# Patient Record
Sex: FEMALE | Race: WHITE | NOT HISPANIC OR LATINO | Employment: OTHER | URBAN - METROPOLITAN AREA
[De-identification: names, ages, dates, MRNs, and addresses within clinical notes are randomized per-mention and may not be internally consistent; named-entity substitution may affect disease eponyms.]

---

## 2017-03-17 NOTE — PATIENT DISCUSSION
(B30.8) Other viral conjunctivitis - Assesment : Examination revealed Viral Conjunctivitis, resolving. Patient currently being treated by Dr. Ivan Perales with Tobramycin and Amoxicillin.  Advice patient to use caution, may be contagious - Plan : Continue Tobramycin QID OU for 2 more days then D/C, continue Amoxicillin until gone RV as scheduled

## 2017-06-20 NOTE — PATIENT DISCUSSION
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 Mac OCT today. Follows with Dr. Florentin Rico, Pt reports cleared for cataract surgery at last visit 2 weeks ago. - Plan : Continue following with Dr. Florentin Rico as scheduled.

## 2017-06-20 NOTE — PATIENT DISCUSSION
(H25.013) Cortical age-related cataract, bilateral - Assesment : Examination revealed Cortical Senile Cataract.  - Plan : See plan #1

## 2017-06-20 NOTE — PATIENT DISCUSSION
(H25.13) Age-related nuclear cataract, bilateral - Assesment : Examination revealed cataract. Pt is bothered and symptomatic. Pt reports was cleared for CE by  at last visit 2 weeks ago. Will request records. - Plan : Recommended cataract extraction to improve visual function. Discussed procedure, risks, and benefits. Answered questions. Pt wishes to proceed. Schedule ASCAN and consult.

## 2017-06-20 NOTE — PATIENT DISCUSSION
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 HVF being monitored by Dr. Ester Hill. - Plan : Continue following with Dr. Ester Hill.

## 2017-06-20 NOTE — PATIENT DISCUSSION
(H25.934) Posterior subcapsular polar age-related cataract, bilateral - Assesment : Examination revealed Posterior Subcapsular Polar Senile Cataract. - Plan : See plan #1.

## 2017-06-20 NOTE — PATIENT DISCUSSION
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 Mac OCT today. Pt follows with Dr. Armando Cordon. - Plan : Monitor for changes yearly with Exams and Mac OCTs. Advised patient to call our office with decreased vision or increased distortion. Continue monitoring Amsler Grid. Continue following with Dr. Armando Cordon as scheduled.

## 2017-08-07 NOTE — PATIENT DISCUSSION
(W92.4183) Nonexudative age-related macular degeneration right eye inte - Assesment : Examination revealed AMD Dry. Mac OCT today. Pt follows with Dr. Araujo President. - Plan : Monitor for changes yearly with Exams and Mac OCTs. Advised patient to call our office with decreased vision or increased distortion. Continue monitoring Amsler Grid. Continue following with Dr. Araujo President as scheduled.

## 2017-08-07 NOTE — PATIENT DISCUSSION
(H25.13) Age-related nuclear cataract, bilateral - Assesment : Examination revealed cataract. Pt is bothered and symptomatic. Pt reports was cleared for CE by  at last visit 2 weeks ago. Advised patient CE will not recover any vision that has been damaged from AMD there will be limitations to vision after CE Do not recommend any ROF secondary to AMD Advised OD will probably have more visual improvement with CE rather than OS - Plan : Risks, Benefits and Alternatives were discussed with patient at length for Cataract Surgery. Visual symptoms are consistent with Cataract findings on examination and current refraction no longer provides satisfactory vision. Patient understands and desires surgery. All questions answered. Risks, Benefits and Alternatives discussed at length for IOL placement. Doctor suggests Limited focus. Patient desires limited focus. Patient will need to wear glasses for best overall vision.   EYE: OS IOL TYPE: LF POST OPERATIVE TARGET: PACKAGE: LF OD to possibly follow Patient to see surgery counselor today

## 2017-08-07 NOTE — PATIENT DISCUSSION
(L57.0) Actinic keratosis - Assesment : Evaluation revealed Actinic Keratosis.   8 mm above lash line central lid left upper lid - Plan : Minor sx JAMES

## 2017-08-07 NOTE — PATIENT DISCUSSION
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 Mac OCT today. Follows with Dr. Cassie La, Pt reports cleared for cataract surgery at last visit 2 weeks ago. - Plan : Continue following with Dr. Cassie La as scheduled.

## 2017-08-18 NOTE — PATIENT DISCUSSION
(L57.0) Actinic keratosis - Assesment : Evaluation revealed Actinic Keratosis.   8 mm above lash line central lid left upper lid - Plan : Minor sx today JAMES Start OTC Neosporin QHS OS for 7 days(do not get in the eye) Rtc as scheduled

## 2017-09-26 NOTE — PATIENT DISCUSSION
(H25.11) Age-related nuclear cataract, right eye - Assesment : Examination revealed cataract. Pt is bothered and symptomatic. Pt reports was cleared for CE by  at last visit 2 weeks ago. Advised patient CE will not recover any vision that has been damaged from AMD there will be limitations to vision after CE Do not recommend any ROF secondary to AMD Advised OD will probably have more visual improvement with CE rather than OS - Plan : Risks, Benefits and Alternatives were discussed with patient at length for Cataract Surgery. Visual symptoms are consistent with Cataract findings on examination and current refraction no longer provides satisfactory vision. Patient understands and desires surgery. All questions answered. Risks, Benefits and Alternatives discussed at length for IOL placement. Doctor suggests Limited focus. Patient desires limited focus. Patient will need to wear glasses for best overall vision.   EYE: OD IOL TYPE: LF POST OPERATIVE TARGET:-0.50 PACKAGE: LF Patient to see surgery counselor today

## 2017-10-17 NOTE — PATIENT DISCUSSION
(Z96.1) Presence of intraocular lens - Assesment : Patient is Pseudophakic. - Plan : Signs and symptoms of infection and retinal detachment are outlined in your surgical packet. Do not rub operated eye. Follow drop schedule. If redness, pain, decreased vision, flashes or floaters occur then contact clinic. 3 WEEK FINAL POST OP / REFRACT/ WITH DR. BEE

## 2017-11-13 NOTE — PATIENT DISCUSSION
(Z96.1) Presence of intraocular lens - Assesment : Patient is Pseudophakic OU. Doing Well. - Plan : Monitor. Call with any vision changes, distortion or other concerns. Continue following with Dr. Oneil Garcia as scheduled. RTC in August for Exam and Mac OCT, sooner if problems or changes occur.

## 2018-04-03 NOTE — PROCEDURE NOTE: CLINICAL
PROCEDURE NOTE: Eylea 2mg #1 OD. Diagnosis: Neovascular AMD with Active CNV. Anesthesia: Topical/Subconjunctival. Prep: Betadine Drops. Prior to injection, risks/benefits/alternatives discussed including infection, loss of vision, hemorrhage, cataract, glaucoma, retinal tears or detachment. The patient wished to proceed with treatment. Betadine prep was performed. Topical anesthesia was induced with Alcaine. Additional anesthesia was achieved using drop(s) or injection checked above. A drop of Povidone-iodine 5% ophthalmic solution was instilled over the injection site and in the inferior fornix. Using the syringe provided, Eylea 2.0mg in 0.05 cc was injected into the vitreous cavity. The remainder of the Eylea in the single-use vial was then discarded in a medical waste disposal container. The needle was passed 3.0 mm posterior to the limbus in pseudophakic patients, and 3.5 mm posterior to the limbus in phakic patients. Patient tolerated procedure well. There were no complications. Injection time: 2:05PM. The eye was irrigated with sterile irrigating solution. Post procedure instructions given. The patient was instructed to return for re-evaluation in approximately 4-12 weeks depending on his/her condition and was told to call immediately if vision decreases and/or if his/her eye becomes red, painful, and/or light sensitive. The patient was instructed to go to the emergency room or call 911 if unable to reach the doctor within an hour or two of trying or calling. The patient was instructed to use Artificial Tears q.i.d. p.r.n for comfort. Rafa Blackman

## 2018-05-09 NOTE — PROCEDURE NOTE: CLINICAL
PROCEDURE NOTE: Eylea 2mg OD. Diagnosis: Neovascular AMD with Active CNV. Anesthesia: Topical/Subconjunctival. Prep: Betadine Drops. Prior to injection, risks/benefits/alternatives discussed including infection, loss of vision, hemorrhage, cataract, glaucoma, retinal tears or detachment. The patient wished to proceed with treatment. Betadine prep was performed. Topical anesthesia was induced with Alcaine. Additional anesthesia was achieved using drop(s) or injection checked above. A drop of Povidone-iodine 5% ophthalmic solution was instilled over the injection site and in the inferior fornix. Using the syringe provided, Eylea 2.0mg in 0.05 cc was injected into the vitreous cavity. The remainder of the Eylea in the single-use vial was then discarded in a medical waste disposal container. The needle was passed 3.0 mm posterior to the limbus in pseudophakic patients, and 3.5 mm posterior to the limbus in phakic patients. Patient tolerated procedure well. There were no complications. Injection time: 3:20 PM. The eye was irrigated with sterile irrigating solution. Post procedure instructions given. The patient was instructed to return for re-evaluation in approximately 4-12 weeks depending on his/her condition and was told to call immediately if vision decreases and/or if his/her eye becomes red, painful, and/or light sensitive. The patient was instructed to go to the emergency room or call 911 if unable to reach the doctor within an hour or two of trying or calling. The patient was instructed to use Artificial Tears q.i.d. p.r.n for comfort. Emmie Mackay

## 2018-06-12 NOTE — PROCEDURE NOTE: CLINICAL
PROCEDURE NOTE: Eylea 2mg OD. Diagnosis: Neovascular AMD with Active CNV. Anesthesia: Topical/Subconjunctival. Prep: Betadine Drops. Prior to injection, risks/benefits/alternatives discussed including infection, loss of vision, hemorrhage, cataract, glaucoma, retinal tears or detachment. The patient wished to proceed with treatment. Betadine prep was performed. Topical anesthesia was induced with Alcaine. Additional anesthesia was achieved using drop(s) or injection checked above. A drop of Povidone-iodine 5% ophthalmic solution was instilled over the injection site and in the inferior fornix. Using the syringe provided, Eylea 2.0mg in 0.05 cc was injected into the vitreous cavity. The remainder of the Eylea in the single-use vial was then discarded in a medical waste disposal container. The needle was passed 3.0 mm posterior to the limbus in pseudophakic patients, and 3.5 mm posterior to the limbus in phakic patients. Patient tolerated procedure well. There were no complications. Injection time: 1:17PM. The eye was irrigated with sterile irrigating solution. Post procedure instructions given. The patient was instructed to return for re-evaluation in approximately 4-12 weeks depending on his/her condition and was told to call immediately if vision decreases and/or if his/her eye becomes red, painful, and/or light sensitive. The patient was instructed to go to the emergency room or call 911 if unable to reach the doctor within an hour or two of trying or calling. The patient was instructed to use Artificial Tears q.i.d. p.r.n for comfort. Blaise Erwin

## 2018-07-26 NOTE — PATIENT DISCUSSION
HYPEROPIA, OU: PRESCRIBED NEW PAL GLASSES. PATIENT IS INTERESTED IN CONTACT LENSES. TRIALS ORDERED AND WILL SCHEDULE CONTACT LENS INSTRUCT WITH TECHNICIAN ONCE TRIALS ARRIVE. WILL NEED TO WEAR OTC READERS OVER CONTACTS FOR READING. FOLLOW-UP AS SCHEDULED.

## 2018-08-06 NOTE — PATIENT DISCUSSION
(H37.4759) Exudative age-rel mclr degn bi with actv chrdl neovas - Assesment : Examination revealed AMD OU. Pt follows with Dr. Justus Alvraez. - Plan : Monitor here for changes yearly with Exams and Mac OCTs. Continue monitoring Amsler Grid. Pt to call our office with  vision or Amsler changes. Continue following with Dr. Justus Alvarez as scheduled. RTC here in 1 year for Exam (and Mac OCT if has not recently seen or about to see Dr. Justus Alvarez), sooner if problems or changes.

## 2018-08-06 NOTE — PATIENT DISCUSSION
(G48.865) Other secondary cataract, bilateral - Assesment : Posterior capsule opacification present. - Plan : Monitor for Changes. Advised patient  of condition and discussed symptoms of worsening and becoming more bothersome. Handout given. Pt to call our office with decreased vision or increased symptoms.

## 2018-08-09 NOTE — PATIENT DISCUSSION
CONTACT LENS INSTRUCT: PATIENT UNSUCCESSFUL WITH I/R TODAY. SHE WOULD LIKE TO RTC TO TRY AGAIN. TRIALS ARE STORED IN SOLUTION IN CL CABINET AT [address] [place] [address]

## 2018-09-17 NOTE — PROCEDURE NOTE: CLINICAL
PROCEDURE NOTE: Lucentis 0.5mg PFS #1 OD. Diagnosis: Neovascular AMD with Active CNV. Anesthesia: Topical/Subconjunctival. Prep: Betadine Flush. Prior to injection, risks/benefits/alternatives discussed including but not limited to infection, loss of vision or eye, hemorrhage, cataract, glaucoma, retinal tears or detachment. The patient wished to proceed with treatment. Topical anesthesia was induced with Alcaine. Additional anesthesia was achieved using drop(s) or injection checked above. A drop of Povidone-iodine 5% ophthalmic solution was instilled over the injection site and in the inferior fornix. Betadine prep was performed. A single use prefilled syringe of intravitreal Lucentis 0.5mg/0.05ml was used and excess discarded. The needle was passed 3.0 mm posterior to the limbus in pseudophakic patients, and 3.5 mm posterior to the limbus in phakic patients. The remainder of the Lucentis 0.5mg in the single-use vial was then discarded in a medical waste disposal container. The eye was irrigated with sterile irrigating solution. Patient tolerated the procedure well. There were no complications. Post procedure instructions given. CF vision checked. Injection Time *. The patient was instructed to return for re-evaluation in approximately 4-12 weeks depending on his/her condition and was told to call immediately if vision decreases and/or if his/her eye becomes red, painful, and/or light sensitive. The patient was instructed to go to the emergency room or call 911 if unable to reach the doctor within an hour or two of trying or calling. Nasir Caldwell

## 2018-09-24 NOTE — PROCEDURE NOTE: CLINICAL
PROCEDURE NOTE: Lucentis 0.5mg PFS OS. Diagnosis: Neovascular AMD with Active CNV. Anesthesia: Topical. Prep: Betadine Flush. Prior to injection, risks/benefits/alternatives discussed including but not limited to infection, loss of vision or eye, hemorrhage, cataract, glaucoma, retinal tears or detachment. The patient wished to proceed with treatment. Topical anesthesia was induced with Alcaine. Additional anesthesia was achieved using drop(s) or injection checked above. A drop of Povidone-iodine 5% ophthalmic solution was instilled over the injection site and in the inferior fornix. Betadine prep was performed. A single use prefilled syringe of intravitreal Lucentis 0.5mg/0.05ml was used and excess discarded. The needle was passed 3.0 mm posterior to the limbus in pseudophakic patients, and 3.5 mm posterior to the limbus in phakic patients. The remainder of the Lucentis 0.5mg in the single-use vial was then discarded in a medical waste disposal container. The eye was irrigated with sterile irrigating solution. Patient tolerated the procedure well. There were no complications. Post procedure instructions given. CF vision checked. Injection Time *. The patient was instructed to return for re-evaluation in approximately 4-12 weeks depending on his/her condition and was told to call immediately if vision decreases and/or if his/her eye becomes red, painful, and/or light sensitive. The patient was instructed to go to the emergency room or call 911 if unable to reach the doctor within an hour or two of trying or calling. Bebo Whitten

## 2018-10-30 NOTE — PROCEDURE NOTE: CLINICAL
PROCEDURE NOTE: Lucentis 0.5mg PFS OD. Diagnosis: Neovascular AMD with Active CNV. Anesthesia: Lidocaine 4%. Prep: Betadine Flush. Prior to injection, risks/benefits/alternatives discussed including but not limited to infection, loss of vision or eye, hemorrhage, cataract, glaucoma, retinal tears or detachment. The patient wished to proceed with treatment. Topical anesthesia was induced with Alcaine. Additional anesthesia was achieved using drop(s) or injection checked above. A drop of Povidone-iodine 5% ophthalmic solution was instilled over the injection site and in the inferior fornix. Betadine prep was performed. A single use prefilled syringe of intravitreal Lucentis 0.5mg/0.05ml was used and excess discarded. The needle was passed 3.0 mm posterior to the limbus in pseudophakic patients, and 3.5 mm posterior to the limbus in phakic patients. The remainder of the Lucentis 0.5mg in the single-use vial was then discarded in a medical waste disposal container. The eye was irrigated with sterile irrigating solution. Patient tolerated the procedure well. There were no complications. Post procedure instructions given. CF vision checked. Injection Time 11:55. The patient was instructed to return for re-evaluation in approximately 4-12 weeks depending on his/her condition and was told to call immediately if vision decreases and/or if his/her eye becomes red, painful, and/or light sensitive. The patient was instructed to go to the emergency room or call 911 if unable to reach the doctor within an hour or two of trying or calling. Romel Johnson

## 2018-11-07 NOTE — PROCEDURE NOTE: CLINICAL
PROCEDURE NOTE: Lucentis 0.5mg PFS OS. Diagnosis: Neovascular AMD with Active CNV. Anesthesia: Lidocaine 4%. Prep: Betadine Flush. Prior to injection, risks/benefits/alternatives discussed including but not limited to infection, loss of vision or eye, hemorrhage, cataract, glaucoma, retinal tears or detachment. The patient wished to proceed with treatment. Topical anesthesia was induced with Alcaine. Additional anesthesia was achieved using drop(s) or injection checked above. A drop of Povidone-iodine 5% ophthalmic solution was instilled over the injection site and in the inferior fornix. Betadine prep was performed. A single use prefilled syringe of intravitreal Lucentis 0.5mg/0.05ml was used and excess discarded. The needle was passed 3.0 mm posterior to the limbus in pseudophakic patients, and 3.5 mm posterior to the limbus in phakic patients. The remainder of the Lucentis 0.5mg in the single-use vial was then discarded in a medical waste disposal container. The eye was irrigated with sterile irrigating solution. Patient tolerated the procedure well. There were no complications. Post procedure instructions given. CF vision checked. Injection Time 1:10PM. The patient was instructed to return for re-evaluation in approximately 4-12 weeks depending on his/her condition and was told to call immediately if vision decreases and/or if his/her eye becomes red, painful, and/or light sensitive. The patient was instructed to go to the emergency room or call 911 if unable to reach the doctor within an hour or two of trying or calling. Alejandra Sanchez

## 2018-12-19 NOTE — PROCEDURE NOTE: CLINICAL
PROCEDURE NOTE: Lucentis 0.5mg PFS OD. Diagnosis: Neovascular AMD with Active CNV. Anesthesia: Lidocaine 4%. Prep: Betadine Flush. Prior to injection, risks/benefits/alternatives discussed including but not limited to infection, loss of vision or eye, hemorrhage, cataract, glaucoma, retinal tears or detachment. The patient wished to proceed with treatment. Topical anesthesia was induced with Alcaine. Additional anesthesia was achieved using drop(s) or injection checked above. A drop of Povidone-iodine 5% ophthalmic solution was instilled over the injection site and in the inferior fornix. Betadine prep was performed. A single use prefilled syringe of intravitreal Lucentis 0.5mg/0.05ml was used and excess discarded. The needle was passed 3.0 mm posterior to the limbus in pseudophakic patients, and 3.5 mm posterior to the limbus in phakic patients. The remainder of the Lucentis 0.5mg in the single-use vial was then discarded in a medical waste disposal container. The eye was irrigated with sterile irrigating solution. Patient tolerated the procedure well. There were no complications. Post procedure instructions given. CF vision checked. Injection Time 318PM. The patient was instructed to return for re-evaluation in approximately 4-12 weeks depending on his/her condition and was told to call immediately if vision decreases and/or if his/her eye becomes red, painful, and/or light sensitive. The patient was instructed to go to the emergency room or call 911 if unable to reach the doctor within an hour or two of trying or calling. Sara Galan

## 2019-01-08 NOTE — PROCEDURE NOTE: CLINICAL
PROCEDURE NOTE: Lucentis 0.5mg PFS OS. Diagnosis: Neovascular AMD with Active CNV. Anesthesia: Topical. Prep: Betadine Flush. Prior to injection, risks/benefits/alternatives discussed including but not limited to infection, loss of vision or eye, hemorrhage, cataract, glaucoma, retinal tears or detachment. The patient wished to proceed with treatment. Topical anesthesia was induced with Alcaine. Additional anesthesia was achieved using drop(s) or injection checked above. A drop of Povidone-iodine 5% ophthalmic solution was instilled over the injection site and in the inferior fornix. Betadine prep was performed. A single use prefilled syringe of intravitreal Lucentis 0.5mg/0.05ml was used and excess discarded. The needle was passed 3.0 mm posterior to the limbus in pseudophakic patients, and 3.5 mm posterior to the limbus in phakic patients. The remainder of the Lucentis 0.5mg in the single-use vial was then discarded in a medical waste disposal container. The eye was irrigated with sterile irrigating solution. Patient tolerated the procedure well. There were no complications. Post procedure instructions given. CF vision checked. Injection Time 1252. The patient was instructed to return for re-evaluation in approximately 4-12 weeks depending on his/her condition and was told to call immediately if vision decreases and/or if his/her eye becomes red, painful, and/or light sensitive. The patient was instructed to go to the emergency room or call 911 if unable to reach the doctor within an hour or two of trying or calling. Nasir Caldwell

## 2019-02-20 NOTE — PROCEDURE NOTE: CLINICAL
PROCEDURE NOTE: Lucentis 0.5mg PFS OD. Diagnosis: Neovascular AMD with Active CNV. Anesthesia: Topical. Prep: Betadine Flush. Prior to injection, risks/benefits/alternatives discussed including but not limited to infection, loss of vision or eye, hemorrhage, cataract, glaucoma, retinal tears or detachment. The patient wished to proceed with treatment. Topical anesthesia was induced with Alcaine. Additional anesthesia was achieved using drop(s) or injection checked above. A drop of Povidone-iodine 5% ophthalmic solution was instilled over the injection site and in the inferior fornix. Betadine prep was performed. A single use prefilled syringe of intravitreal Lucentis 0.5mg/0.05ml was used and excess discarded. The needle was passed 3.0 mm posterior to the limbus in pseudophakic patients, and 3.5 mm posterior to the limbus in phakic patients. The remainder of the Lucentis 0.5mg in the single-use vial was then discarded in a medical waste disposal container. The eye was irrigated with sterile irrigating solution. Patient tolerated the procedure well. There were no complications. Post procedure instructions given. CF vision checked. Injection Time 2:55PM. The patient was instructed to return for re-evaluation in approximately 4-12 weeks depending on his/her condition and was told to call immediately if vision decreases and/or if his/her eye becomes red, painful, and/or light sensitive. The patient was instructed to go to the emergency room or call 911 if unable to reach the doctor within an hour or two of trying or calling. Valerie Kruger

## 2019-02-27 NOTE — PROCEDURE NOTE: CLINICAL
PROCEDURE NOTE: Lucentis 0.5mg PFS OS. Diagnosis: Neovascular AMD with Active CNV. Anesthesia: Topical. Prep: Betadine Flush. Prior to injection, risks/benefits/alternatives discussed including but not limited to infection, loss of vision or eye, hemorrhage, cataract, glaucoma, retinal tears or detachment. The patient wished to proceed with treatment. Topical anesthesia was induced with Alcaine. Additional anesthesia was achieved using drop(s) or injection checked above. A drop of Povidone-iodine 5% ophthalmic solution was instilled over the injection site and in the inferior fornix. Betadine prep was performed. A single use prefilled syringe of intravitreal Lucentis 0.5mg/0.05ml was used and excess discarded. The needle was passed 3.0 mm posterior to the limbus in pseudophakic patients, and 3.5 mm posterior to the limbus in phakic patients. The remainder of the Lucentis 0.5mg in the single-use vial was then discarded in a medical waste disposal container. The eye was irrigated with sterile irrigating solution. Patient tolerated the procedure well. There were no complications. Post procedure instructions given. CF vision checked. Injection Time 2:30 PM. The patient was instructed to return for re-evaluation in approximately 4-12 weeks depending on his/her condition and was told to call immediately if vision decreases and/or if his/her eye becomes red, painful, and/or light sensitive. The patient was instructed to go to the emergency room or call 911 if unable to reach the doctor within an hour or two of trying or calling. David Acvees

## 2019-03-27 NOTE — PROCEDURE NOTE: CLINICAL
PROCEDURE NOTE: Lucentis 0.5mg PFS OD. Diagnosis: Neovascular AMD with Active CNV. Anesthesia: Lidocaine 4%. Prep: Betadine Flush. Prior to injection, risks/benefits/alternatives discussed including but not limited to infection, loss of vision or eye, hemorrhage, cataract, glaucoma, retinal tears or detachment. The patient wished to proceed with treatment. Topical anesthesia was induced with Alcaine. Additional anesthesia was achieved using drop(s) or injection checked above. A drop of Povidone-iodine 5% ophthalmic solution was instilled over the injection site and in the inferior fornix. Betadine prep was performed. A single use prefilled syringe of intravitreal Lucentis 0.5mg/0.05ml was used and excess discarded. The needle was passed 3.0 mm posterior to the limbus in pseudophakic patients, and 3.5 mm posterior to the limbus in phakic patients. The remainder of the Lucentis 0.5mg in the single-use vial was then discarded in a medical waste disposal container. The eye was irrigated with sterile irrigating solution. Patient tolerated the procedure well. There were no complications. Post procedure instructions given. CF vision checked. Injection Time 1:55PM. The patient was instructed to return for re-evaluation in approximately 4-12 weeks depending on his/her condition and was told to call immediately if vision decreases and/or if his/her eye becomes red, painful, and/or light sensitive. The patient was instructed to go to the emergency room or call 911 if unable to reach the doctor within an hour or two of trying or calling. Sara Galan

## 2019-04-03 NOTE — PROCEDURE NOTE: CLINICAL
PROCEDURE NOTE: Lucentis 0.5mg PFS OS. Diagnosis: Neovascular AMD with Active CNV. Anesthesia: Lidocaine 4%. Prep: Betadine Flush. Prior to injection, risks/benefits/alternatives discussed including but not limited to infection, loss of vision or eye, hemorrhage, cataract, glaucoma, retinal tears or detachment. The patient wished to proceed with treatment. Topical anesthesia was induced with Alcaine. Additional anesthesia was achieved using drop(s) or injection checked above. A drop of Povidone-iodine 5% ophthalmic solution was instilled over the injection site and in the inferior fornix. Betadine prep was performed. A single use prefilled syringe of intravitreal Lucentis 0.5mg/0.05ml was used and excess discarded. The needle was passed 3.0 mm posterior to the limbus in pseudophakic patients, and 3.5 mm posterior to the limbus in phakic patients. The remainder of the Lucentis 0.5mg in the single-use vial was then discarded in a medical waste disposal container. The eye was irrigated with sterile irrigating solution. Patient tolerated the procedure well. There were no complications. Post procedure instructions given. CF vision checked. Injection Time 2:50 PM. The patient was instructed to return for re-evaluation in approximately 4-12 weeks depending on his/her condition and was told to call immediately if vision decreases and/or if his/her eye becomes red, painful, and/or light sensitive. The patient was instructed to go to the emergency room or call 911 if unable to reach the doctor within an hour or two of trying or calling. Harry Bates

## 2019-05-08 NOTE — PROCEDURE NOTE: CLINICAL
PROCEDURE NOTE: Lucentis 0.5mg PFS OD. Diagnosis: Neovascular AMD with Active CNV. Anesthesia: Lidocaine 4%. Prep: Betadine Flush. Prior to injection, risks/benefits/alternatives discussed including but not limited to infection, loss of vision or eye, hemorrhage, cataract, glaucoma, retinal tears or detachment. The patient wished to proceed with treatment. Topical anesthesia was induced with Alcaine. Additional anesthesia was achieved using drop(s) or injection checked above. A drop of Povidone-iodine 5% ophthalmic solution was instilled over the injection site and in the inferior fornix. Betadine prep was performed. A single use prefilled syringe of intravitreal Lucentis 0.5mg/0.05ml was used and excess discarded. The needle was passed 3.0 mm posterior to the limbus in pseudophakic patients, and 3.5 mm posterior to the limbus in phakic patients. The remainder of the Lucentis 0.5mg in the single-use vial was then discarded in a medical waste disposal container. The eye was irrigated with sterile irrigating solution. Patient tolerated the procedure well. There were no complications. Post procedure instructions given. CF vision checked. Injection Time 1:50PM. The patient was instructed to return for re-evaluation in approximately 4-12 weeks depending on his/her condition and was told to call immediately if vision decreases and/or if his/her eye becomes red, painful, and/or light sensitive. The patient was instructed to go to the emergency room or call 911 if unable to reach the doctor within an hour or two of trying or calling. Yenny Nelson

## 2019-05-15 NOTE — PROCEDURE NOTE: CLINICAL
PROCEDURE NOTE: Lucentis 0.5mg PFS OS. Diagnosis: Neovascular AMD with Active CNV. Anesthesia: Lidocaine 4%. Prep: Betadine Flush. Prior to injection, risks/benefits/alternatives discussed including but not limited to infection, loss of vision or eye, hemorrhage, cataract, glaucoma, retinal tears or detachment. The patient wished to proceed with treatment. Topical anesthesia was induced with Alcaine. Additional anesthesia was achieved using drop(s) or injection checked above. A drop of Povidone-iodine 5% ophthalmic solution was instilled over the injection site and in the inferior fornix. Betadine prep was performed. A single use prefilled syringe of intravitreal Lucentis 0.5mg/0.05ml was used and excess discarded. The needle was passed 3.0 mm posterior to the limbus in pseudophakic patients, and 3.5 mm posterior to the limbus in phakic patients. The remainder of the Lucentis 0.5mg in the single-use vial was then discarded in a medical waste disposal container. The eye was irrigated with sterile irrigating solution. Patient tolerated the procedure well. There were no complications. Post procedure instructions given. CF vision checked. Injection Time 1:02PM. The patient was instructed to return for re-evaluation in approximately 4-12 weeks depending on his/her condition and was told to call immediately if vision decreases and/or if his/her eye becomes red, painful, and/or light sensitive. The patient was instructed to go to the emergency room or call 911 if unable to reach the doctor within an hour or two of trying or calling. Precious Mariscal

## 2019-06-17 NOTE — PROCEDURE NOTE: CLINICAL
PROCEDURE NOTE: Lucentis 0.5mg PFS OD. Diagnosis: Neovascular AMD with Active CNV. Anesthesia: Lidocaine. Prep: Betadine Flush. Prior to injection, risks/benefits/alternatives discussed including but not limited to infection, loss of vision or eye, hemorrhage, cataract, glaucoma, retinal tears or detachment. The patient wished to proceed with treatment. Topical anesthesia was induced with Alcaine. Additional anesthesia was achieved using drop(s) or injection checked above. A drop of Povidone-iodine 5% ophthalmic solution was instilled over the injection site and in the inferior fornix. Betadine prep was performed. A single use prefilled syringe of intravitreal Lucentis 0.5mg/0.05ml was used and excess discarded. The needle was passed 3.0 mm posterior to the limbus in pseudophakic patients, and 3.5 mm posterior to the limbus in phakic patients. The remainder of the Lucentis 0.5mg in the single-use vial was then discarded in a medical waste disposal container. The eye was irrigated with sterile irrigating solution. Patient tolerated the procedure well. There were no complications. Post procedure instructions given. CF vision checked. Injection Time 1:25PM. The patient was instructed to return for re-evaluation in approximately 4-12 weeks depending on his/her condition and was told to call immediately if vision decreases and/or if his/her eye becomes red, painful, and/or light sensitive. The patient was instructed to go to the emergency room or call 911 if unable to reach the doctor within an hour or two of trying or calling. Valerie Kruger

## 2019-06-25 NOTE — PROCEDURE NOTE: CLINICAL
PROCEDURE NOTE: Lucentis 0.5mg PFS OS. Diagnosis: Neovascular AMD with Active CNV. Anesthesia: Lidocaine 4%. Prep: Betadine Flush. Prior to injection, risks/benefits/alternatives discussed including but not limited to infection, loss of vision or eye, hemorrhage, cataract, glaucoma, retinal tears or detachment. The patient wished to proceed with treatment. Topical anesthesia was induced with Alcaine. Additional anesthesia was achieved using drop(s) or injection checked above. A drop of Povidone-iodine 5% ophthalmic solution was instilled over the injection site and in the inferior fornix. Betadine prep was performed. A single use prefilled syringe of intravitreal Lucentis 0.5mg/0.05ml was used and excess discarded. The needle was passed 3.0 mm posterior to the limbus in pseudophakic patients, and 3.5 mm posterior to the limbus in phakic patients. The remainder of the Lucentis 0.5mg in the single-use vial was then discarded in a medical waste disposal container. The eye was irrigated with sterile irrigating solution. Patient tolerated the procedure well. There were no complications. Post procedure instructions given. CF vision checked. Injection Time 1:26PM,. The patient was instructed to return for re-evaluation in approximately 4-12 weeks depending on his/her condition and was told to call immediately if vision decreases and/or if his/her eye becomes red, painful, and/or light sensitive. The patient was instructed to go to the emergency room or call 911 if unable to reach the doctor within an hour or two of trying or calling. David Aceves

## 2019-08-06 NOTE — PROCEDURE NOTE: CLINICAL
PROCEDURE NOTE: Lucentis 0.5mg PFS OD. Diagnosis: Neovascular AMD with Active CNV. Anesthesia: Lidocaine. Prep: Betadine Flush. Prior to injection, risks/benefits/alternatives discussed including but not limited to infection, loss of vision or eye, hemorrhage, cataract, glaucoma, retinal tears or detachment. The patient wished to proceed with treatment. Topical anesthesia was induced with Alcaine. Additional anesthesia was achieved using drop(s) or injection checked above. A drop of Povidone-iodine 5% ophthalmic solution was instilled over the injection site and in the inferior fornix. Betadine prep was performed. A single use prefilled syringe of intravitreal Lucentis 0.5mg/0.05ml was used and excess discarded. The needle was passed 3.0 mm posterior to the limbus in pseudophakic patients, and 3.5 mm posterior to the limbus in phakic patients. The remainder of the Lucentis 0.5mg in the single-use vial was then discarded in a medical waste disposal container. The eye was irrigated with sterile irrigating solution. Patient tolerated the procedure well. There were no complications. Post procedure instructions given. CF vision checked. Injection Time 3:33PM. The patient was instructed to return for re-evaluation in approximately 4-12 weeks depending on his/her condition and was told to call immediately if vision decreases and/or if his/her eye becomes red, painful, and/or light sensitive. The patient was instructed to go to the emergency room or call 911 if unable to reach the doctor within an hour or two of trying or calling. Yenny Nelson

## 2019-08-07 NOTE — PATIENT DISCUSSION
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 Pt follows with Dr. Miguel Lazaro. - Plan : Monitor here for changes yearly with Exams and Mac OCTs. Continue monitoring [facility]. Continue following with Dr. Miguel Lazaro as scheduled.

## 2019-08-07 NOTE — PATIENT DISCUSSION
(F01.086) Other secondary cataract, bilateral - Assesment : Significant posterior capsule opacification present. Pt is bothered and symptomatic. - Plan : Recommend that patient undergo a YAG Capsulotomy OU (Both Eyes) to improve visual function. Discussed that opacity is the cause of the decreased vision. Discussed the risks and benefits of performing a YAG Capsulotomy including the risk of floaters, retinal detachment, and retinal swelling. Patient understands to expect floaters after the YAG capsulotomy procedures and understands that they may remain indefinitely. All questions answered and handout given today. Pt wishes to proceed. Schedule Yag Cap OU and 2 week PO appt (needs Refraction at PO appt).

## 2019-08-07 NOTE — PATIENT DISCUSSION
(B88.663) Keratoconjunct sicca, not specified as Sjogren's, bilateral - Assesment : Examination revealed Dry Eye Syndrome. [place] VF and DMV form today. - Plan : Monitor for changes. ATs prn daily. Advised patient to call our office with decreased vision or increased symptoms.

## 2019-08-12 NOTE — PROCEDURE NOTE: CLINICAL
PROCEDURE NOTE: Lucentis 0.5mg PFS OS. Diagnosis: Neovascular AMD with Active CNV. Anesthesia: Lidocaine 4%. Prep: Betadine Flush. Prior to injection, risks/benefits/alternatives discussed including but not limited to infection, loss of vision or eye, hemorrhage, cataract, glaucoma, retinal tears or detachment. The patient wished to proceed with treatment. Topical anesthesia was induced with Alcaine. Additional anesthesia was achieved using drop(s) or injection checked above. A drop of Povidone-iodine 5% ophthalmic solution was instilled over the injection site and in the inferior fornix. Betadine prep was performed. A single use prefilled syringe of intravitreal Lucentis 0.5mg/0.05ml was used and excess discarded. The needle was passed 3.0 mm posterior to the limbus in pseudophakic patients, and 3.5 mm posterior to the limbus in phakic patients. The remainder of the Lucentis 0.5mg in the single-use vial was then discarded in a medical waste disposal container. The eye was irrigated with sterile irrigating solution. Patient tolerated the procedure well. There were no complications. Post procedure instructions given. CF vision checked. Injection Time 10:05 AM. The patient was instructed to return for re-evaluation in approximately 4-12 weeks depending on his/her condition and was told to call immediately if vision decreases and/or if his/her eye becomes red, painful, and/or light sensitive. The patient was instructed to go to the emergency room or call 911 if unable to reach the doctor within an hour or two of trying or calling. Alejandra Sanchez

## 2019-09-03 NOTE — PATIENT DISCUSSION
(Z15.869) Keratoconjunct sicca, not specified as Sjogren's, bilateral - Assesment : Examination revealed Dry Eye Syndrome  Pt reports uses CPAP qhs. - Plan : Monitor for changes. Explained JOYCE and recommended regular use of ATs and recommended ATs or Gel ATs qhs due to CPAP.

## 2019-09-03 NOTE — PATIENT DISCUSSION
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 Mac OCT performed today. Pt follows with Dr. Edgar Araujo. - Plan : Monitor here for changes yearly with Exams and Mac OCTs. Continue monitoring [facility] Grid. Continue following with Dr. Edgar Araujo as scheduled.

## 2019-09-03 NOTE — PATIENT DISCUSSION
(D26.812) Other secondary cataract, bilateral - Assesment : s/p Yag Cap OU. Doing well. - Plan : Monitor for changes. Updated GLRx given today and recommended wearing LGRx to improve visual function. Pt to call with any vision changes, problems, or concerns. Continue following with Dr. Zulema Shepard as scheduled. RTC in 1 year for Exam and Mac OCT, sooner if problems or changes.

## 2019-09-10 NOTE — PATIENT DISCUSSION
Monitor for changes. Explained JOYCE and recommended regular use of ATs and recommended ATs or Gel ATs qhs due to CPAP.

## 2019-10-09 NOTE — PROCEDURE NOTE: CLINICAL
PROCEDURE NOTE: Lucentis 0.5mg PFS OD. Diagnosis: Neovascular AMD with Active CNV. Anesthesia: Lidocaine 4%. Prep: Betadine Flush. Prior to injection, risks/benefits/alternatives discussed including but not limited to infection, loss of vision or eye, hemorrhage, cataract, glaucoma, retinal tears or detachment. The patient wished to proceed with treatment. Topical anesthesia was induced with Alcaine. Additional anesthesia was achieved using drop(s) or injection checked above. A drop of Povidone-iodine 5% ophthalmic solution was instilled over the injection site and in the inferior fornix. Betadine prep was performed. A single use prefilled syringe of intravitreal Lucentis 0.5mg/0.05ml was used and excess discarded. The needle was passed 3.0 mm posterior to the limbus in pseudophakic patients, and 3.5 mm posterior to the limbus in phakic patients. The remainder of the Lucentis 0.5mg in the single-use vial was then discarded in a medical waste disposal container. The eye was irrigated with sterile irrigating solution. Patient tolerated the procedure well. There were no complications. Post procedure instructions given. CF vision checked. Injection Time 1:34PM. The patient was instructed to return for re-evaluation in approximately 4-12 weeks depending on his/her condition and was told to call immediately if vision decreases and/or if his/her eye becomes red, painful, and/or light sensitive. The patient was instructed to go to the emergency room or call 911 if unable to reach the doctor within an hour or two of trying or calling. Blaise Erwin

## 2019-10-15 NOTE — PROCEDURE NOTE: CLINICAL

## 2019-11-18 NOTE — PROCEDURE NOTE: CLINICAL
PROCEDURE NOTE: Lucentis 0.5mg PFS OD. Diagnosis: Neovascular AMD with Active CNV. Anesthesia: Lidocaine 4%. Prep: Betadine Flush. Prior to injection, risks/benefits/alternatives discussed including but not limited to infection, loss of vision or eye, hemorrhage, cataract, glaucoma, retinal tears or detachment. The patient wished to proceed with treatment. Topical anesthesia was induced with Alcaine. Additional anesthesia was achieved using drop(s) or injection checked above. A drop of Povidone-iodine 5% ophthalmic solution was instilled over the injection site and in the inferior fornix. Betadine prep was performed. A single use prefilled syringe of intravitreal Lucentis 0.5mg/0.05ml was used and excess discarded. The needle was passed 3.0 mm posterior to the limbus in pseudophakic patients, and 3.5 mm posterior to the limbus in phakic patients. The remainder of the Lucentis 0.5mg in the single-use vial was then discarded in a medical waste disposal container. The eye was irrigated with sterile irrigating solution. Patient tolerated the procedure well. There were no complications. Post procedure instructions given. CF vision checked. Injection Time 935A. The patient was instructed to return for re-evaluation in approximately 4-12 weeks depending on his/her condition and was told to call immediately if vision decreases and/or if his/her eye becomes red, painful, and/or light sensitive. The patient was instructed to go to the emergency room or call 911 if unable to reach the doctor within an hour or two of trying or calling. Kaleb Newton

## 2019-11-25 NOTE — PROCEDURE NOTE: CLINICAL

## 2019-12-18 NOTE — PROCEDURE NOTE: CLINICAL

## 2020-01-06 NOTE — PROCEDURE NOTE: CLINICAL
PROCEDURE NOTE: Lucentis 0.5mg PFS OS. Diagnosis: Neovascular AMD with Active CNV. Anesthesia: Lidocaine 4%. Prep: Betadine Flush. Prior to injection, risks/benefits/alternatives discussed including but not limited to infection, loss of vision or eye, hemorrhage, cataract, glaucoma, retinal tears or detachment. The patient wished to proceed with treatment. Topical anesthesia was induced with Alcaine. Additional anesthesia was achieved using drop(s) or injection checked above. A drop of Povidone-iodine 5% ophthalmic solution was instilled over the injection site and in the inferior fornix. Betadine prep was performed. A single use prefilled syringe of intravitreal Lucentis 0.5mg/0.05ml was used and excess discarded. The needle was passed 3.0 mm posterior to the limbus in pseudophakic patients, and 3.5 mm posterior to the limbus in phakic patients. Injection Time 8:40 AM. Patient tolerated the procedure well. There were no complications. The eye was irrigated with sterile irrigating solution. Post procedure instructions given. The patient was instructed to use Artificial Tears q.i.d. p.r.n for comfort. The patient was instructed to return for re-evaluation in approximately 4-12 weeks depending on his/her condition and was told to call immediately if vision decreases and/or if his/her eye becomes red, painful, and/or light sensitive. The patient was instructed to go to the emergency room or call 911 if unable to reach the doctor within an hour or two of trying or calling. Paula Randolph

## 2020-02-10 NOTE — PROCEDURE NOTE: CLINICAL
PROCEDURE NOTE: Lucentis 0.5mg PFS OD. Diagnosis: Neovascular AMD with Active CNV. Prior to injection, risks/benefits/alternatives discussed including but not limited to infection, loss of vision or eye, hemorrhage, cataract, glaucoma, retinal tears or detachment. The patient wished to proceed with treatment. Topical anesthesia was induced with Alcaine. Additional anesthesia was achieved using drop(s) or injection checked above. A drop of Povidone-iodine 5% ophthalmic solution was instilled over the injection site and in the inferior fornix. Betadine prep was performed. A single use prefilled syringe of intravitreal Lucentis 0.5mg/0.05ml was used and excess was disposed of as waste. The needle was passed 3.0 mm posterior to the limbus in pseudophakic patients, and 3.5 mm posterior to the limbus in phakic patients. Injection Time 841. Patient tolerated the procedure well. There were no complications. The eye was irrigated with sterile irrigating solution. Post procedure instructions given. The patient was instructed to use Artificial Tears q.i.d. p.r.n for comfort. The patient was instructed to return for re-evaluation in approximately 4-12 weeks depending on his/her condition and was told to call immediately if vision decreases and/or if his/her eye becomes red, painful, and/or light sensitive. The patient was instructed to go to the emergency room or call 911 if unable to reach the doctor within an hour or two of trying or calling. Pasquale Andre

## 2020-02-18 NOTE — PROCEDURE NOTE: CLINICAL
PROCEDURE NOTE: Lucentis 0.5mg PFS OS. Diagnosis: Neovascular AMD with Active CNV. Anesthesia: Lidocaine 4%. Prep: Betadine Flush. Prior to injection, risks/benefits/alternatives discussed including but not limited to infection, loss of vision or eye, hemorrhage, cataract, glaucoma, retinal tears or detachment. The patient wished to proceed with treatment. Topical anesthesia was induced with Alcaine. Additional anesthesia was achieved using drop(s) or injection checked above. A drop of Povidone-iodine 5% ophthalmic solution was instilled over the injection site and in the inferior fornix. Betadine prep was performed. A single use prefilled syringe of intravitreal Lucentis 0.5mg/0.05ml was used and excess was disposed of as waste. The needle was passed 3.0 mm posterior to the limbus in pseudophakic patients, and 3.5 mm posterior to the limbus in phakic patients. Injection Time 9:00 AM. Patient tolerated the procedure well. There were no complications. The eye was irrigated with sterile irrigating solution. Post procedure instructions given. The patient was instructed to use Artificial Tears q.i.d. p.r.n for comfort. The patient was instructed to return for re-evaluation in approximately 4-12 weeks depending on his/her condition and was told to call immediately if vision decreases and/or if his/her eye becomes red, painful, and/or light sensitive. The patient was instructed to go to the emergency room or call 911 if unable to reach the doctor within an hour or two of trying or calling. Sara Galan

## 2020-03-23 NOTE — PROCEDURE NOTE: CLINICAL
PROCEDURE NOTE: Lucentis 0.5mg PFS OD. Diagnosis: Neovascular AMD with Active CNV. Anesthesia: Lidocaine 4%. Prep: Betadine Flush. Prior to injection, risks/benefits/alternatives discussed including but not limited to infection, loss of vision or eye, hemorrhage, cataract, glaucoma, retinal tears or detachment. The patient wished to proceed with treatment. Topical anesthesia was induced with Alcaine. Additional anesthesia was achieved using drop(s) or injection checked above. A drop of Povidone-iodine 5% ophthalmic solution was instilled over the injection site and in the inferior fornix. Betadine prep was performed. A single use prefilled syringe of intravitreal Lucentis 0.5mg/0.05ml was used and excess was disposed of as waste. The needle was passed 3.0 mm posterior to the limbus in pseudophakic patients, and 3.5 mm posterior to the limbus in phakic patients. Injection Time 939A. Patient tolerated the procedure well. There were no complications. The eye was irrigated with sterile irrigating solution. Post procedure instructions given. The patient was instructed to use Artificial Tears q.i.d. p.r.n for comfort. The patient was instructed to return for re-evaluation in approximately 4-12 weeks depending on his/her condition and was told to call immediately if vision decreases and/or if his/her eye becomes red, painful, and/or light sensitive. The patient was instructed to go to the emergency room or call 911 if unable to reach the doctor within an hour or two of trying or calling. David Aceves

## 2020-03-30 NOTE — PROCEDURE NOTE: CLINICAL
PROCEDURE NOTE: Lucentis 0.5mg PFS OS. Diagnosis: Neovascular AMD with Active CNV. Anesthesia: Lidocaine. Prep: Betadine Flush. Prior to injection, risks/benefits/alternatives discussed including but not limited to infection, loss of vision or eye, hemorrhage, cataract, glaucoma, retinal tears or detachment. The patient wished to proceed with treatment. Topical anesthesia was induced with Alcaine. Additional anesthesia was achieved using drop(s) or injection checked above. A drop of Povidone-iodine 5% ophthalmic solution was instilled over the injection site and in the inferior fornix. Betadine prep was performed. A single use prefilled syringe of intravitreal Lucentis 0.5mg/0.05ml was used and excess was disposed of as waste. The needle was passed 3.0 mm posterior to the limbus in pseudophakic patients, and 3.5 mm posterior to the limbus in phakic patients. Injection Time *. Patient tolerated the procedure well. There were no complications. The eye was irrigated with sterile irrigating solution. Post procedure instructions given. The patient was instructed to use Artificial Tears q.i.d. p.r.n for comfort. The patient was instructed to return for re-evaluation in approximately 4-12 weeks depending on his/her condition and was told to call immediately if vision decreases and/or if his/her eye becomes red, painful, and/or light sensitive. The patient was instructed to go to the emergency room or call 911 if unable to reach the doctor within an hour or two of trying or calling. Blaise Erwin

## 2020-05-04 NOTE — PROCEDURE NOTE: CLINICAL
PROCEDURE NOTE: Lucentis 0.5mg PFS OD. Diagnosis: Neovascular AMD with Active CNV. Anesthesia: Lidocaine 4%. Prep: Betadine Flush. Prior to injection, risks/benefits/alternatives discussed including but not limited to infection, loss of vision or eye, hemorrhage, cataract, glaucoma, retinal tears or detachment. The patient wished to proceed with treatment. Topical anesthesia was induced with Alcaine. Additional anesthesia was achieved using drop(s) or injection checked above. A drop of Povidone-iodine 5% ophthalmic solution was instilled over the injection site and in the inferior fornix. Betadine prep was performed. A single use prefilled syringe of intravitreal Lucentis 0.5mg/0.05ml was used and excess was disposed of as waste. The needle was passed 3.0 mm posterior to the limbus in pseudophakic patients, and 3.5 mm posterior to the limbus in phakic patients. Injection Time 823. Patient tolerated the procedure well. There were no complications. The eye was irrigated with sterile irrigating solution. Post procedure instructions given. The patient was instructed to use Artificial Tears q.i.d. p.r.n for comfort. The patient was instructed to return for re-evaluation in approximately 4-12 weeks depending on his/her condition and was told to call immediately if vision decreases and/or if his/her eye becomes red, painful, and/or light sensitive. The patient was instructed to go to the emergency room or call 911 if unable to reach the doctor within an hour or two of trying or calling. Precious Mariscal

## 2020-05-11 NOTE — PROCEDURE NOTE: CLINICAL
PROCEDURE NOTE: Lucentis 0.5mg PFS OS. Diagnosis: Neovascular AMD with Active CNV. Anesthesia: Lidocaine 4%. Prep: Betadine Flush. Prior to injection, risks/benefits/alternatives discussed including but not limited to infection, loss of vision or eye, hemorrhage, cataract, glaucoma, retinal tears or detachment. The patient wished to proceed with treatment. Topical anesthesia was induced with Alcaine. Additional anesthesia was achieved using drop(s) or injection checked above. A drop of Povidone-iodine 5% ophthalmic solution was instilled over the injection site and in the inferior fornix. Betadine prep was performed. A single use prefilled syringe of intravitreal Lucentis 0.5mg/0.05ml was used and excess was disposed of as waste. The needle was passed 3.0 mm posterior to the limbus in pseudophakic patients, and 3.5 mm posterior to the limbus in phakic patients. Injection Time 816. Patient tolerated the procedure well. There were no complications. The eye was irrigated with sterile irrigating solution. Post procedure instructions given. The patient was instructed to use Artificial Tears q.i.d. p.r.n for comfort. The patient was instructed to return for re-evaluation in approximately 4-12 weeks depending on his/her condition and was told to call immediately if vision decreases and/or if his/her eye becomes red, painful, and/or light sensitive. The patient was instructed to go to the emergency room or call 911 if unable to reach the doctor within an hour or two of trying or calling. Jesús Banegas

## 2020-06-15 NOTE — PROCEDURE NOTE: CLINICAL

## 2020-06-24 NOTE — PROCEDURE NOTE: CLINICAL
PROCEDURE NOTE: Lucentis 0.5mg PFS OS. Diagnosis: Neovascular AMD with Active CNV. Anesthesia: Lidocaine 4%. Prep: Betadine Flush. Prior to injection, risks/benefits/alternatives discussed including but not limited to infection, loss of vision or eye, hemorrhage, cataract, glaucoma, retinal tears or detachment. The patient wished to proceed with treatment. Topical anesthesia was induced with Alcaine. Additional anesthesia was achieved using drop(s) or injection checked above. A drop of Povidone-iodine 5% ophthalmic solution was instilled over the injection site and in the inferior fornix. Betadine prep was performed. A single use prefilled syringe of intravitreal Lucentis 0.5mg/0.05ml was used and excess was disposed of as waste. The needle was passed 3.0 mm posterior to the limbus in pseudophakic patients, and 3.5 mm posterior to the limbus in phakic patients. Injection Time *. Patient tolerated the procedure well. There were no complications. The eye was irrigated with sterile irrigating solution. Post procedure instructions given. The patient was instructed to use Artificial Tears q.i.d. p.r.n for comfort. The patient was instructed to return for re-evaluation in approximately 4-12 weeks depending on his/her condition and was told to call immediately if vision decreases and/or if his/her eye becomes red, painful, and/or light sensitive. The patient was instructed to go to the emergency room or call 911 if unable to reach the doctor within an hour or two of trying or calling. Jesús Banegas

## 2020-08-05 NOTE — PROCEDURE NOTE: CLINICAL
PROCEDURE NOTE: Lucentis 0.5mg PFS OD. Diagnosis: Neovascular AMD with Active CNV. Anesthesia: Lidocaine 4%. Prep: Betadine Flush. Prior to injection, risks/benefits/alternatives discussed including but not limited to infection, loss of vision or eye, hemorrhage, cataract, glaucoma, retinal tears or detachment. The patient wished to proceed with treatment. Topical anesthesia was induced with Alcaine. Additional anesthesia was achieved using drop(s) or injection checked above. A drop of Povidone-iodine 5% ophthalmic solution was instilled over the injection site and in the inferior fornix. Betadine prep was performed. A single use prefilled syringe of intravitreal Lucentis 0.5mg/0.05ml was used and excess was disposed of as waste. The needle was passed 3.0 mm posterior to the limbus in pseudophakic patients, and 3.5 mm posterior to the limbus in phakic patients. Injection Time 8:31. Patient tolerated the procedure well. There were no complications. The eye was irrigated with sterile irrigating solution. Post procedure instructions given. The patient was instructed to use Artificial Tears q.i.d. p.r.n for comfort. The patient was instructed to return for re-evaluation in approximately 4-12 weeks depending on his/her condition and was told to call immediately if vision decreases and/or if his/her eye becomes red, painful, and/or light sensitive. The patient was instructed to go to the emergency room or call 911 if unable to reach the doctor within an hour or two of trying or calling. Erum Luevano

## 2020-08-12 NOTE — PROCEDURE NOTE: CLINICAL
PROCEDURE NOTE: Lucentis 0.5mg PFS OS. Diagnosis: Neovascular AMD with Active CNV. Anesthesia: Lidocaine 4%. Prep: Betadine Flush. Prior to injection, risks/benefits/alternatives discussed including but not limited to infection, loss of vision or eye, hemorrhage, cataract, glaucoma, retinal tears or detachment. The patient wished to proceed with treatment. Topical anesthesia was induced with Alcaine. Additional anesthesia was achieved using drop(s) or injection checked above. A drop of Povidone-iodine 5% ophthalmic solution was instilled over the injection site and in the inferior fornix. Betadine prep was performed. A single use prefilled syringe of intravitreal Lucentis 0.5mg/0.05ml was used and excess was disposed of as waste. The needle was passed 3.0 mm posterior to the limbus in pseudophakic patients, and 3.5 mm posterior to the limbus in phakic patients. Injection Time 940. Patient tolerated the procedure well. There were no complications. The eye was irrigated with sterile irrigating solution. Post procedure instructions given. The patient was instructed to use Artificial Tears q.i.d. p.r.n for comfort. The patient was instructed to return for re-evaluation in approximately 4-12 weeks depending on his/her condition and was told to call immediately if vision decreases and/or if his/her eye becomes red, painful, and/or light sensitive. The patient was instructed to go to the emergency room or call 911 if unable to reach the doctor within an hour or two of trying or calling. Precious Mariscal

## 2020-08-12 NOTE — PATIENT DISCUSSION
HAD MI AND TRIPLE BYPASS 1/2020. STOPPED BLOOD THINNERS 2 WEEKS AGO, ONLY ON ASA. Patient ate dinner and attended group. Patient had visitors this evening. Patient had his family session today. Patient had no issues this shift. Patient involved in no falls this shift, Skid proof footwear utilized. Patient is safe on the unit. Patient took nighttime medications.

## 2020-09-16 NOTE — PROCEDURE NOTE: CLINICAL
PROCEDURE NOTE: Lucentis 0.5mg PFS OD. Diagnosis: Neovascular AMD with Active CNV. Prior to injection, risks/benefits/alternatives discussed including but not limited to infection, loss of vision or eye, hemorrhage, cataract, glaucoma, retinal tears or detachment. The patient wished to proceed with treatment. Topical anesthesia was induced with Alcaine. Additional anesthesia was achieved using drop(s) or injection checked above. A drop of Povidone-iodine 5% ophthalmic solution was instilled over the injection site and in the inferior fornix. Betadine prep was performed. A single use prefilled syringe of intravitreal Lucentis 0.5mg/0.05ml was used and excess was disposed of as waste. The needle was passed 3.0 mm posterior to the limbus in pseudophakic patients, and 3.5 mm posterior to the limbus in phakic patients. Injection Time 905A. Patient tolerated the procedure well. There were no complications. The eye was irrigated with sterile irrigating solution. Post procedure instructions given. The patient was instructed to use Artificial Tears q.i.d. p.r.n for comfort. The patient was instructed to return for re-evaluation in approximately 4-12 weeks depending on his/her condition and was told to call immediately if vision decreases and/or if his/her eye becomes red, painful, and/or light sensitive. The patient was instructed to go to the emergency room or call 911 if unable to reach the doctor within an hour or two of trying or calling. Blaise Erwin

## 2020-09-21 NOTE — PROCEDURE NOTE: CLINICAL
PROCEDURE NOTE: Lucentis 0.5mg PFS OS. Diagnosis: Neovascular AMD with Active CNV. Anesthesia: Lidocaine 4%. Prep: Betadine Flush. Prior to injection, risks/benefits/alternatives discussed including but not limited to infection, loss of vision or eye, hemorrhage, cataract, glaucoma, retinal tears or detachment. The patient wished to proceed with treatment. Topical anesthesia was induced with Alcaine. Additional anesthesia was achieved using drop(s) or injection checked above. A drop of Povidone-iodine 5% ophthalmic solution was instilled over the injection site and in the inferior fornix. Betadine prep was performed. A single use prefilled syringe of intravitreal Lucentis 0.5mg/0.05ml was used and excess was disposed of as waste. The needle was passed 3.0 mm posterior to the limbus in pseudophakic patients, and 3.5 mm posterior to the limbus in phakic patients. Injection Time *. Patient tolerated the procedure well. There were no complications. The eye was irrigated with sterile irrigating solution. Post procedure instructions given. The patient was instructed to use Artificial Tears q.i.d. p.r.n for comfort. The patient was instructed to return for re-evaluation in approximately 4-12 weeks depending on his/her condition and was told to call immediately if vision decreases and/or if his/her eye becomes red, painful, and/or light sensitive. The patient was instructed to go to the emergency room or call 911 if unable to reach the doctor within an hour or two of trying or calling. David Aceves

## 2020-10-26 NOTE — PROCEDURE NOTE: CLINICAL
PROCEDURE NOTE: Lucentis 0.5mg PFS OD. Diagnosis: Neovascular AMD with Active CNV. Anesthesia: Lidocaine 4%. Prep: Betadine Flush. Prior to injection, risks/benefits/alternatives discussed including but not limited to infection, loss of vision or eye, hemorrhage, cataract, glaucoma, retinal tears or detachment. The patient wished to proceed with treatment. Topical anesthesia was induced with Alcaine. Additional anesthesia was achieved using drop(s) or injection checked above. A drop of Povidone-iodine 5% ophthalmic solution was instilled over the injection site and in the inferior fornix. Betadine prep was performed. A single use prefilled syringe of intravitreal Lucentis 0.5mg/0.05ml was used and excess was disposed of as waste. The needle was passed 3.0 mm posterior to the limbus in pseudophakic patients, and 3.5 mm posterior to the limbus in phakic patients. Injection Time 901A. Patient tolerated the procedure well. There were no complications. The eye was irrigated with sterile irrigating solution. Post procedure instructions given. The patient was instructed to use Artificial Tears q.i.d. p.r.n for comfort. The patient was instructed to return for re-evaluation in approximately 4-12 weeks depending on his/her condition and was told to call immediately if vision decreases and/or if his/her eye becomes red, painful, and/or light sensitive. The patient was instructed to go to the emergency room or call 911 if unable to reach the doctor within an hour or two of trying or calling. Emmie Mackay  Section Day: 4  Patient is feeling well overall.

## 2020-11-02 NOTE — PROCEDURE NOTE: CLINICAL
PROCEDURE NOTE: Lucentis 0.5mg PFS OS. Diagnosis: Neovascular AMD with Active CNV. Anesthesia: Lidocaine. Prep: Betadine Flush. Prior to injection, risks/benefits/alternatives discussed including but not limited to infection, loss of vision or eye, hemorrhage, cataract, glaucoma, retinal tears or detachment. The patient wished to proceed with treatment. Topical anesthesia was induced with Alcaine. Additional anesthesia was achieved using drop(s) or injection checked above. A drop of Povidone-iodine 5% ophthalmic solution was instilled over the injection site and in the inferior fornix. Betadine prep was performed. A single use prefilled syringe of intravitreal Lucentis 0.5mg/0.05ml was used and excess was disposed of as waste. The needle was passed 3.0 mm posterior to the limbus in pseudophakic patients, and 3.5 mm posterior to the limbus in phakic patients. Injection Time 834A. Patient tolerated the procedure well. There were no complications. The eye was irrigated with sterile irrigating solution. Post procedure instructions given. The patient was instructed to use Artificial Tears q.i.d. p.r.n for comfort. The patient was instructed to return for re-evaluation in approximately 4-12 weeks depending on his/her condition and was told to call immediately if vision decreases and/or if his/her eye becomes red, painful, and/or light sensitive. The patient was instructed to go to the emergency room or call 911 if unable to reach the doctor within an hour or two of trying or calling. Cornelio Reeder

## 2020-12-09 NOTE — PROCEDURE NOTE: CLINICAL

## 2020-12-10 NOTE — PATIENT DISCUSSION
HAD MI AND TRIPLE BYPASS 1/2020. STOPPED BLOOD THINNERS 2 WEEKS AGO, ONLY ON ASA. chronic  - continue home levothyroxine 125mcg  - TSH low 0.07

## 2020-12-14 NOTE — PROCEDURE NOTE: CLINICAL
PROCEDURE NOTE: Lucentis 0.5mg PFS OS. Diagnosis: Neovascular AMD with Active CNV. Anesthesia: Lidocaine. Prep: Betadine Flush. Prior to injection, risks/benefits/alternatives discussed including but not limited to infection, loss of vision or eye, hemorrhage, cataract, glaucoma, retinal tears or detachment. The patient wished to proceed with treatment. Topical anesthesia was induced with Alcaine. Additional anesthesia was achieved using drop(s) or injection checked above. A drop of Povidone-iodine 5% ophthalmic solution was instilled over the injection site and in the inferior fornix. Betadine prep was performed. A single use prefilled syringe of intravitreal Lucentis 0.5mg/0.05ml was used and excess was disposed of as waste. The needle was passed 3.0 mm posterior to the limbus in pseudophakic patients, and 3.5 mm posterior to the limbus in phakic patients. Injection Time 1024. Patient tolerated the procedure well. There were no complications. The eye was irrigated with sterile irrigating solution. Post procedure instructions given. The patient was instructed to use Artificial Tears q.i.d. p.r.n for comfort. The patient was instructed to return for re-evaluation in approximately 4-12 weeks depending on his/her condition and was told to call immediately if vision decreases and/or if his/her eye becomes red, painful, and/or light sensitive. The patient was instructed to go to the emergency room or call 911 if unable to reach the doctor within an hour or two of trying or calling. Cornelio Reeder

## 2021-01-19 NOTE — PROCEDURE NOTE: CLINICAL
PROCEDURE NOTE: Lucentis 0.5mg PFS OD. Diagnosis: Neovascular AMD with Active CNV. Anesthesia: Lidocaine 4%. Prep: Betadine Flush. Prior to injection, risks/benefits/alternatives discussed including but not limited to infection, loss of vision or eye, hemorrhage, cataract, glaucoma, retinal tears or detachment. The patient wished to proceed with treatment. Topical anesthesia was induced with Alcaine. Additional anesthesia was achieved using drop(s) or injection checked above. A drop of Povidone-iodine 5% ophthalmic solution was instilled over the injection site and in the inferior fornix. Betadine prep was performed. A single use prefilled syringe of intravitreal Lucentis 0.5mg/0.05ml was used and excess was disposed of as waste. The needle was passed 3.0 mm posterior to the limbus in pseudophakic patients, and 3.5 mm posterior to the limbus in phakic patients. Injection Time 833AM. Patient tolerated the procedure well. There were no complications. The eye was irrigated with sterile irrigating solution. Post procedure instructions given. The patient was instructed to use Artificial Tears q.i.d. p.r.n for comfort. The patient was instructed to return for re-evaluation in approximately 4-12 weeks depending on his/her condition and was told to call immediately if vision decreases and/or if his/her eye becomes red, painful, and/or light sensitive. The patient was instructed to go to the emergency room or call 911 if unable to reach the doctor within an hour or two of trying or calling. Sara Galan

## 2021-01-27 NOTE — PATIENT DISCUSSION
POST INJECTION EVALUATION, no signs of new infection, tear, RD, VF, EOM, CNS, Vascular or other problems or side effect from previous injection(s). Never

## 2021-02-02 NOTE — PROCEDURE NOTE: CLINICAL
PROCEDURE NOTE: Lucentis 0.5mg PFS OS. Diagnosis: Neovascular AMD with Active CNV. Anesthesia: Lidocaine 4%. Prep: Betadine Flush. Prior to injection, risks/benefits/alternatives discussed including but not limited to infection, loss of vision or eye, hemorrhage, cataract, glaucoma, retinal tears or detachment. The patient wished to proceed with treatment. Topical anesthesia was induced with Alcaine. Additional anesthesia was achieved using drop(s) or injection checked above. A drop of Povidone-iodine 5% ophthalmic solution was instilled over the injection site and in the inferior fornix. Betadine prep was performed. A single use prefilled syringe of intravitreal Lucentis 0.5mg/0.05ml was used and excess was disposed of as waste. The needle was passed 3.0 mm posterior to the limbus in pseudophakic patients, and 3.5 mm posterior to the limbus in phakic patients. Injection Time 1002AM. Patient tolerated the procedure well. There were no complications. The eye was irrigated with sterile irrigating solution. Post procedure instructions given. The patient was instructed to use Artificial Tears q.i.d. p.r.n for comfort. The patient was instructed to return for re-evaluation in approximately 4-12 weeks depending on his/her condition and was told to call immediately if vision decreases and/or if his/her eye becomes red, painful, and/or light sensitive. The patient was instructed to go to the emergency room or call 911 if unable to reach the doctor within an hour or two of trying or calling. Nikko Hoffman

## 2021-03-16 NOTE — PROCEDURE NOTE: CLINICAL

## 2021-03-30 NOTE — PROCEDURE NOTE: CLINICAL
PROCEDURE NOTE: Lucentis 0.5mg PFS OS. Diagnosis: Neovascular AMD with Active CNV. Anesthesia: Lidocaine. Prep: Betadine Flush. Prior to injection, risks/benefits/alternatives discussed including but not limited to infection, loss of vision or eye, hemorrhage, cataract, glaucoma, retinal tears or detachment. The patient wished to proceed with treatment. Topical anesthesia was induced with Alcaine. Additional anesthesia was achieved using drop(s) or injection checked above. A drop of Povidone-iodine 5% ophthalmic solution was instilled over the injection site and in the inferior fornix. Betadine prep was performed. A single use prefilled syringe of intravitreal Lucentis 0.5mg/0.05ml was used and excess was disposed of as waste. The needle was passed 3.0 mm posterior to the limbus in pseudophakic patients, and 3.5 mm posterior to the limbus in phakic patients. Injection Time 830. Patient tolerated the procedure well. There were no complications. The eye was irrigated with sterile irrigating solution. Post procedure instructions given. The patient was instructed to use Artificial Tears q.i.d. p.r.n for comfort. The patient was instructed to return for re-evaluation in approximately 4-12 weeks depending on his/her condition and was told to call immediately if vision decreases and/or if his/her eye becomes red, painful, and/or light sensitive. The patient was instructed to go to the emergency room or call 911 if unable to reach the doctor within an hour or two of trying or calling. Maki Johnson

## 2021-05-05 NOTE — PROCEDURE NOTE: CLINICAL
PROCEDURE NOTE: Lucentis 0.5mg PFS OD. Diagnosis: Neovascular AMD with Active CNV. Anesthesia: Lidocaine 4%. Prep: Betadine Flush. Prior to injection, risks/benefits/alternatives discussed including but not limited to infection, loss of vision or eye, hemorrhage, cataract, glaucoma, retinal tears or detachment. The patient wished to proceed with treatment. Topical anesthesia was induced with Alcaine. Additional anesthesia was achieved using drop(s) or injection checked above. A drop of Povidone-iodine 5% ophthalmic solution was instilled over the injection site and in the inferior fornix. Betadine prep was performed. A single use prefilled syringe of intravitreal Lucentis 0.5mg/0.05ml was used and excess was disposed of as waste. The needle was passed 3.0 mm posterior to the limbus in pseudophakic patients, and 3.5 mm posterior to the limbus in phakic patients. Injection Time 1004. Patient tolerated the procedure well. There were no complications. The eye was irrigated with sterile irrigating solution. Post procedure instructions given. The patient was instructed to use Artificial Tears q.i.d. p.r.n for comfort. The patient was instructed to return for re-evaluation in approximately 4-12 weeks depending on his/her condition and was told to call immediately if vision decreases and/or if his/her eye becomes red, painful, and/or light sensitive. The patient was instructed to go to the emergency room or call 911 if unable to reach the doctor within an hour or two of trying or calling. Precious Mariscal

## 2021-05-06 NOTE — PATIENT DISCUSSION
EMERGENCY DEPARTMENT HISTORY AND PHYSICAL EXAM     ------------------------------------------------------------------------------------------------------  Please note that this dictation was completed with [employer], the [employer] voice recognition software. Quite often unanticipated grammatical, syntax, homophones, and other interpretive errors are inadvertently transcribed by the computer software. Please disregard these errors. Please excuse any errors that have escaped final proofreading.  -----------------------------------------------------------------------------------------------------------------    Date: 5/6/2021  Patient Name: Alida Heller    History of Presenting Illness     Chief Complaint   Patient presents with    Abdominal Pain     right sided abdominal pain that goes to right side of back     Flank Pain       History Provided By: Patient    HPI: Alida Heller is a 20 y.o. female, with significant pmhx of previous STI, HSV, chronic kidney disease, reflux, who presents via private vehicle  to the ED with c/o right-sided flank pain that woke her up out of sleep this evening. Denies associated nausea, vomiting or fever although notes some fatigue. Notes increased urinary frequency without hematuria or dysuria. Pt also specifically denies any recent fevers, chills, CP, SOB, nausea, vomiting, diarrhea, changes in BM, or headache. PCP: Rudy Mckinley MD    Social Hx: denies tobacco, denies EtOH, denies Illicit Drugs     There are no other complaints, changes, or physical findings at this time.      Allergies   Allergen Reactions    Penicillins Hives    Vancomycin Itching and Other (comments)     Lips went numb, \"swollen throat\" and itchy from her \"scalp to her legs\"         Current Facility-Administered Medications   Medication Dose Route Frequency Provider Last Rate Last Admin    ondansetron (ZOFRAN) injection 4 mg  4 mg IntraVENous Q1H PRN Emanuel Bethea MD        ketorolac (TORADOL) RECENT DIAGNOSIS 12/13/16, WILL OBTAIN VISUAL FIELDS injection 15 mg  15 mg IntraVENous NOW Retta Krabbe, MD         Current Outpatient Medications   Medication Sig Dispense Refill    dicyclomine (BENTYL) 10 mg capsule Take 1 Cap by mouth three (3) times daily as needed for Abdominal Cramps. 10 Cap 0    polyethylene glycol (Miralax) 17 gram/dose powder Take 17 g by mouth daily. 1 tablespoon with 8 oz of water daily 1530 g 0    ondansetron (Zofran ODT) 4 mg disintegrating tablet Take 1 Tab by mouth every eight (8) hours as needed for Nausea. 10 Tab 0    ondansetron (ZOFRAN ODT) 4 mg disintegrating tablet Take 1 Tab by mouth every eight (8) hours as needed for Nausea or Vomiting. 20 Tab 0    ascorbic acid, vitamin C, (Vitamin C) 500 mg tablet Take 1 Tab by mouth two (2) times a day. 10 Tab 0    zinc sulfate 220 mg tablet Take 1 Tab by mouth daily. 5 Tab 0    valACYclovir (Valtrex) 1 gram tablet Take 1,000 mg by mouth daily.  acetaminophen (TYLENOL) 325 mg tablet Take 2 Tabs by mouth every six (6) hours as needed for Pain or Fever. 12 Tab 0       Past History     Past Medical History:  Past Medical History:   Diagnosis Date    Chlamydia     Chronic kidney disease     Endocrine disease     hypoglycemia    Generalized anxiety disorder with panic attacks 7/15/2020    GERD (gastroesophageal reflux disease)     Headache     High risk heterosexual behavior 7/15/2020    HSV (herpes simplex virus) anogenital infection 7/13/2020    Hypokalemia     Kidney disease     \"kidney failure a few years ago\"    Psychotic disorder ([facility] [address]     Trichomonal infection        Past Surgical History:  No past surgical history on file.     Family History:  Family History   Problem Relation Age of Onset   Zee Mocktye Migraines Mother     Hypertension Father     Hypertension Maternal Uncle     Diabetes Paternal Aunt     Diabetes Paternal Uncle     Hypertension Paternal Grandfather     Stroke Paternal Grandfather        Social History:  Social History     Tobacco Use    Smoking status: Never Smoker    Smokeless tobacco: Never Used   Substance Use Topics    Alcohol use: No    Drug use: No       Allergies: Allergies   Allergen Reactions    Penicillins Hives    Vancomycin Itching and Other (comments)     Lips went numb, \"swollen throat\" and itchy from her \"scalp to her legs\"         Review of Systems   Review of Systems   Constitutional: Positive for fatigue. Negative for fever. Eyes: Negative. Respiratory: Negative. Negative for shortness of breath. Cardiovascular: Negative for chest pain. Gastrointestinal: Negative for abdominal pain, nausea and vomiting. Endocrine: Negative. Genitourinary: Positive for flank pain, frequency and urgency. Negative for difficulty urinating, dysuria and hematuria. Skin: Negative. Neurological: Negative. Psychiatric/Behavioral: Negative for suicidal ideas. All other systems reviewed and are negative. Physical Exam   Physical Exam  Vitals signs and nursing note reviewed. Constitutional:       General: She is not in acute distress. Appearance: She is well-developed. She is not diaphoretic. HENT:      Head: Normocephalic and atraumatic. Nose: Nose normal.   Eyes:      General: No scleral icterus. Conjunctiva/sclera: Conjunctivae normal.   Neck:      Musculoskeletal: Normal range of motion. Trachea: No tracheal deviation. Cardiovascular:      Rate and Rhythm: Normal rate and regular rhythm. Heart sounds: Normal heart sounds. No murmur. No friction rub. Pulmonary:      Effort: Pulmonary effort is normal. No respiratory distress. Breath sounds: Normal breath sounds. No stridor. No wheezing or rales. Abdominal:      General: Bowel sounds are normal. There is no distension. Palpations: Abdomen is soft. Tenderness: There is abdominal tenderness in the right lower quadrant. There is right CVA tenderness. There is no guarding or rebound. Musculoskeletal: Normal range of motion. General: No tenderness. Skin:     General: Skin is warm and dry. Findings: No rash. Neurological:      Mental Status: She is alert and oriented to person, place, and time. Cranial Nerves: No cranial nerve deficit. Psychiatric:         Speech: Speech normal.         Behavior: Behavior normal.         Thought Content: Thought content normal.         Judgment: Judgment normal.           Diagnostic Study Results     Labs -     Recent Results (from the past 12 hour(s))   CBC WITH AUTOMATED DIFF    Collection Time: 05/06/21  1:17 AM   Result Value Ref Range    WBC 8.2 3.6 - 11.0 K/uL    RBC 4.08 3.80 - 5.20 M/uL    HGB 12.5 11.5 - 16.0 g/dL    HCT 38.0 35.0 - 47.0 %    MCV 93.1 80.0 - 99.0 FL    MCH 30.6 26.0 - 34.0 PG    MCHC 32.9 30.0 - 36.5 g/dL    RDW 11.8 11.5 - 14.5 %    PLATELET 248 577 - 213 K/uL    MPV 10.8 8.9 - 12.9 FL    NRBC 0.0 0  WBC    ABSOLUTE NRBC 0.00 0.00 - 0.01 K/uL    NEUTROPHILS 60 32 - 75 %    LYMPHOCYTES 33 12 - 49 %    MONOCYTES 6 5 - 13 %    EOSINOPHILS 1 0 - 7 %    BASOPHILS 0 0 - 1 %    IMMATURE GRANULOCYTES 0 0.0 - 0.5 %    ABS. NEUTROPHILS 4.8 1.8 - 8.0 K/UL    ABS. LYMPHOCYTES 2.7 0.8 - 3.5 K/UL    ABS. MONOCYTES 0.5 0.0 - 1.0 K/UL    ABS. EOSINOPHILS 0.1 0.0 - 0.4 K/UL    ABS. BASOPHILS 0.0 0.0 - 0.1 K/UL    ABS. IMM. GRANS. 0.0 0.00 - 0.04 K/UL    DF AUTOMATED     METABOLIC PANEL, COMPREHENSIVE    Collection Time: 05/06/21  1:17 AM   Result Value Ref Range    Sodium 137 136 - 145 mmol/L    Potassium 3.6 3.5 - 5.1 mmol/L    Chloride 106 97 - 108 mmol/L    CO2 28 21 - 32 mmol/L    Anion gap 3 (L) 5 - 15 mmol/L    Glucose 93 65 - 100 mg/dL    BUN 7 6 - 20 MG/DL    Creatinine 0.72 0.55 - 1.02 MG/DL    BUN/Creatinine ratio 10 (L) 12 - 20      GFR est AA >60 >60 ml/min/1.73m2    GFR est non-AA >60 >60 ml/min/1.73m2    Calcium 8.9 8.5 - 10.1 MG/DL    Bilirubin, total 0.5 0.2 - 1.0 MG/DL    ALT (SGPT) 15 12 - 78 U/L    AST (SGOT) 9 (L) 15 - 37 U/L    Alk.  phosphatase 36 (L) 45 - 117 U/L    Protein, total 8.1 6.4 - 8.2 g/dL    Albumin 4.2 3.5 - 5.0 g/dL    Globulin 3.9 2.0 - 4.0 g/dL    A-G Ratio 1.1 1.1 - 2.2     URINALYSIS W/ REFLEX CULTURE    Collection Time: 05/06/21  1:17 AM    Specimen: Urine   Result Value Ref Range    Color YELLOW/STRAW      Appearance CLEAR CLEAR      Specific gravity 1.006 1.003 - 1.030      pH (UA) 7.5 5.0 - 8.0      Protein Negative NEG mg/dL    Glucose Negative NEG mg/dL    Ketone Negative NEG mg/dL    Bilirubin Negative NEG      Blood Negative NEG      Urobilinogen 0.2 0.2 - 1.0 EU/dL    Nitrites Negative NEG      Leukocyte Esterase Negative NEG      WBC 0-4 0 - 4 /hpf    RBC 0-5 0 - 5 /hpf    Epithelial cells FEW FEW /lpf    Bacteria Negative NEG /hpf    UA:UC IF INDICATED CULTURE NOT INDICATED BY UA RESULT CNI      Hyaline cast 0-2 0 - 5 /lpf   HCG URINE, QL. - POC    Collection Time: 05/06/21  1:26 AM   Result Value Ref Range    Pregnancy test,urine (POC) Negative NEG         Radiologic Studies -   CT ABD PELV WO CONT   Final Result   Moderate to large amount of proximal colonic stool. No bowel obstruction. No   other acute abnormality in the abdomen or pelvis. CT Results  (Last 48 hours)               05/06/21 0148  CT ABD PELV WO CONT Final result    Impression:  Moderate to large amount of proximal colonic stool. No bowel obstruction. No   other acute abnormality in the abdomen or pelvis. Narrative:  EXAM:  CT abdomen pelvis without contrast       INDICATION: Right flank pain       COMPARISON: CT 7/26/2019. TECHNIQUE: Helical CT of the abdomen  and pelvis  without contrast. Coronal and   sagittal reformats are performed. CT dose reduction was achieved through use of   a standardized protocol tailored for this examination and automatic exposure   control for dose modulation. FINDINGS:    Solid organ evaluation is limited without contrast.        The visualized lung bases demonstrate no mass or consolidation.  The heart size   is normal. There is no pericardial or pleural effusion. There is no renal, ureteral, or bladder calculus. The kidneys are symmetric   without hydronephrosis. There is no perinephric fluid or fat stranding. The liver, spleen, pancreas, and adrenal glands are normal.  The gall bladder is   present  without intra- or extra-hepatic biliary dilatation. There is a moderate to large amount of proximal colonic stool. There are no   dilated bowel loops. The appendix is not visualized, but there is no   inflammation in the right lower abdomen. .         There are no enlarged lymph nodes. There is no free fluid or free air. The   aorta tapers without aneurysm. The urinary bladder is unremarkable. There is no pelvic mass. The bony structures are age-appropriate. CXR Results  (Last 48 hours)    None            Medical Decision Making   I am the first provider for this patient. I reviewed the vital signs, available nursing notes, past medical history, past surgical history, family history and social history. Vital Signs-Reviewed the patient's vital signs. Patient Vitals for the past 12 hrs:   Temp Pulse Resp BP SpO2   05/06/21 0059 97.5 °F (36.4 °C) 65 18 115/63 99 %       Pulse Oximetry Analysis - 99% on RA Normal    Records Reviewed/Interpretted: Nursing Notes from triage and Old Medical Records, noting previous ER visits for minor medical emergencies    Provider Notes (Medical Decision Making):     DDX:  UTI, pyelonephritis, kidney stone appendicitis, ovarian cyst, ectopic    Plan:  UA, labs UPT, analgesia, antiemetic, IV fluids, CT scan    Impression:  Constipation, abdominal pain    ED Course:   Initial assessment performed. The patients presenting problems have been discussed, and they are in agreement with the care plan formulated and outlined with them. I have encouraged them to ask questions as they arise throughout their visit.     I reviewed our electronic medical record system for any past medical records that were available that may contribute to the patients current condition, the nursing notes and and vital signs from today's visit  Nursing notes will be reviewed as they become available in realtime while the pt has been in the ED. Eric Jimenes MD    I personally reviewed/interpreted pt's imaging. Agree with official read by radiology as noted above. Eric Jimenes MD      2:24 AM  Progress note:  Pt noted to be feeling better, ready for discharge. Discussed lab and imaging findings with pt, specifically noting the patient. Pt will follow up with primary care as instructed. All questions have been answered, pt voiced understanding and agreement with plan. Specific return precautions provided in addition to instructions for pt to return to the ED immediately should sx worsen at any time. Eric Jimenes MD             Critical Care Time:     none      Diagnosis     Clinical Impression:   1. Constipation, unspecified constipation type    2. Abdominal pain, right lower quadrant        PLAN:  1. Current Discharge Medication List      START taking these medications    Details   dicyclomine (BENTYL) 10 mg capsule Take 1 Cap by mouth three (3) times daily as needed for Abdominal Cramps. Qty: 10 Cap, Refills: 0      polyethylene glycol (Miralax) 17 gram/dose powder Take 17 g by mouth daily. 1 tablespoon with 8 oz of water daily  Qty: 1530 g, Refills: 0           2. Follow-up Information     Follow up With Specialties Details Why Contact Info    Bri Rose MD [facility] Schedule an appointment as soon as possible for a visit in 2 days  [address]  [phone]          Return to ED if worse     Disposition:    2:24 AM   The patient's results have been reviewed with family and/or caregiver.  They verbally convey their understanding and agreement of the patient's signs, symptoms, diagnosis, treatment and prognosis and additionally agree to follow up as recommended in the discharge instructions or to return to the Emergency Room should the patient's condition change prior to their follow-up appointment. The family and/or caregiver verbally agrees with the care-plan and all of their questions have been answered. The discharge instructions have also been provided to the them with educational information regarding the patient's diagnosis as well a list of reasons why the patient would want to return to the ER prior to their follow-up appointment should their condition change.   Bean Cheng MD

## 2021-05-10 NOTE — PATIENT DISCUSSION
Exudative Macular Degeneration OU - Treatment options were discussed including anti-VEGF injections. The importance of followup visits with retina and continued treament was discussed.

## 2021-05-10 NOTE — PATIENT DISCUSSION
1.  Pseudophakia OU - IOLs stable. OPen capsules ou. Monitor for changes in vision. 2. Exudative Macular Degeneration OU - Treatment by DR Candelario Garcia every 5 weeks with injections. anti-VEGF injections. The importance of followup visits with retina and continued treament was discussed. 3.  PVD OU:  Patient was cautioned to call our office immediately if they experience a substantial change in their symptoms such as an increase in floaters persistent flashes loss of visual field (may appear as a shadow or a curtain) or decrease in visual acuity as these may indicate a retinal tear or detachment. 4.  Dry Eyes OU:  Start artificials tears. Encouraged regular use. 5.  Dermatochalasis OU:  Patient currently asymptomatic. Observe. 6. Pt using Prog and discussed getting sv reading rx and holding closer. Pt will change his prog for driving and dashboard. Pt says he renewed the DL last yr for 6 yrs. Advised pt he did not have adequate vision to pass a drivers test and should not be driving. 7.   RTN 1 yr CE/OCT Mac

## 2021-05-10 NOTE — PATIENT DISCUSSION
1.  Pseudophakia OU - IOLs stable. OPen capsules ou. Monitor for changes in vision. 2. Exudative Macular Degeneration OU - Treatment by DR Hector Canseco every 5 weeks with injections. anti-VEGF injections. The importance of followup visits with retina and continued treament was discussed. 3.  PVD OU:  Patient was cautioned to call our office immediately if they experience a substantial change in their symptoms such as an increase in floaters persistent flashes loss of visual field (may appear as a shadow or a curtain) or decrease in visual acuity as these may indicate a retinal tear or detachment. 4.  Dry Eyes OU:  Start artificials tears. Encouraged regular use. 5.  Dermatochalasis OU:  Patient currently asymptomatic. Observe. 6. Pt using Prog and discussed getting sv reading rx and holding closer. Pt will change his prog for driving and dashboard. Pt says he renewed the DL last yr for 6 yrs. Advised pt he did not have adequate vision to pass a drivers test and should not be driving. 7. RTN 1 yr CE/OCT Mac.

## 2021-05-10 NOTE — PATIENT DISCUSSION
PVD OU:  Patient was cautioned to call our office immediately if they experience a substantial change in their symptoms such as an increase in floaters persistent flashes loss of visual field (may appear as a shadow or a curtain) or decrease in visual acuity as these may indicate a retinal tear or detachment. If this is a new problem patient will need to return for re-examination  as determined by the physician.

## 2021-05-12 NOTE — PROCEDURE NOTE: CLINICAL
PROCEDURE NOTE: Lucentis 0.5mg PFS OS. Diagnosis: Neovascular AMD with Active CNV. Anesthesia: Lidocaine 4%. Prep: Betadine Flush. Prior to injection, risks/benefits/alternatives discussed including but not limited to infection, loss of vision or eye, hemorrhage, cataract, glaucoma, retinal tears or detachment. The patient wished to proceed with treatment. Topical anesthesia was induced with Alcaine. Additional anesthesia was achieved using drop(s) or injection checked above. A drop of Povidone-iodine 5% ophthalmic solution was instilled over the injection site and in the inferior fornix. Betadine prep was performed. A single use prefilled syringe of intravitreal Lucentis 0.5mg/0.05ml was used and excess was disposed of as waste. The needle was passed 3.0 mm posterior to the limbus in pseudophakic patients, and 3.5 mm posterior to the limbus in phakic patients. Injection Time 834AM. Patient tolerated the procedure well. There were no complications. The eye was irrigated with sterile irrigating solution. Post procedure instructions given. The patient was instructed to use Artificial Tears q.i.d. p.r.n for comfort. The patient was instructed to return for re-evaluation in approximately 4-12 weeks depending on his/her condition and was told to call immediately if vision decreases and/or if his/her eye becomes red, painful, and/or light sensitive. The patient was instructed to go to the emergency room or call 911 if unable to reach the doctor within an hour or two of trying or calling. Agus Murphy

## 2021-05-31 NOTE — PATIENT DISCUSSION
Monitor. · Acute on chronic low back pain, ongoing for the past 3 weeks, recently hospitalized 5/4 and managed on prednisone taper  Steroid initially helped her back pain but back pain recurred when prednisone dosage finished  · CT lumbar spine on admission - showed severe degenerative disc disease from L2- L3, with disc herniation L4- L5  · Febrile in the ED, meeting SIRS criteria, ED doc reports poor rectal tone  Concerned for possible back abscess  Spoke to neuro surgery who recommended patient have an MRI of her back performed  Patient currently refusing, as she prefers to be knocked out for MRI    Patient is aware of the risk of undiagnosed abscess/diskitis   · On cefepime and vancomycin per ID recs  · Blood cultures #2/2 positive for Ecoli   · Infectious disease on board  · Trend WBC and fever curve  · Repeat blood cultures pending  · Plan for repeat CT lumbar spine w/Contrast today - if CT results are inconclusive, would initiate transfer to [place] for MRI with anesthesia and neurosurgical evaluation

## 2021-06-14 NOTE — PROCEDURE NOTE: CLINICAL

## 2021-06-21 NOTE — PROCEDURE NOTE: CLINICAL
PROCEDURE NOTE: Lucentis 0.5mg PFS OS. Diagnosis: Neovascular AMD with Active CNV. Anesthesia: Lidocaine 4%. Prep: Betadine Flush. Prior to injection, risks/benefits/alternatives discussed including but not limited to infection, loss of vision or eye, hemorrhage, cataract, glaucoma, retinal tears or detachment. The patient wished to proceed with treatment. Topical anesthesia was induced with Alcaine. Additional anesthesia was achieved using drop(s) or injection checked above. A drop of Povidone-iodine 5% ophthalmic solution was instilled over the injection site and in the inferior fornix. Betadine prep was performed. A single use prefilled syringe of intravitreal Lucentis 0.5mg/0.05ml was used and excess was disposed of as waste. The needle was passed 3.0 mm posterior to the limbus in pseudophakic patients, and 3.5 mm posterior to the limbus in phakic patients. Injection Time 836. Patient tolerated the procedure well. There were no complications. The eye was irrigated with sterile irrigating solution. Post procedure instructions given. The patient was instructed to use Artificial Tears q.i.d. p.r.n for comfort. The patient was instructed to return for re-evaluation in approximately 4-12 weeks depending on his/her condition and was told to call immediately if vision decreases and/or if his/her eye becomes red, painful, and/or light sensitive. The patient was instructed to go to the emergency room or call 911 if unable to reach the doctor within an hour or two of trying or calling. Yenny Nelson

## 2021-08-05 NOTE — PROCEDURE NOTE: CLINICAL
PROCEDURE NOTE: Lucentis 0.5mg PFS OD. Diagnosis: Neovascular AMD with Active CNV. Anesthesia: Lidocaine 4%. Prep: Betadine Flush. Prior to injection, risks/benefits/alternatives discussed including but not limited to infection, loss of vision or eye, hemorrhage, cataract, glaucoma, retinal tears or detachment. The patient wished to proceed with treatment. Topical anesthesia was induced with Alcaine. Additional anesthesia was achieved using drop(s) or injection checked above. A drop of Povidone-iodine 5% ophthalmic solution was instilled over the injection site and in the inferior fornix. Betadine prep was performed. A single use prefilled syringe of intravitreal Lucentis 0.5mg/0.05ml was used and excess was disposed of as waste. The needle was passed 3.0 mm posterior to the limbus in pseudophakic patients, and 3.5 mm posterior to the limbus in phakic patients. Injection Time 835. Patient tolerated the procedure well. There were no complications. The eye was irrigated with sterile irrigating solution. Post procedure instructions given. The patient was instructed to use Artificial Tears q.i.d. p.r.n for comfort. The patient was instructed to return for re-evaluation in approximately 4-12 weeks depending on his/her condition and was told to call immediately if vision decreases and/or if his/her eye becomes red, painful, and/or light sensitive. The patient was instructed to go to the emergency room or call 911 if unable to reach the doctor within an hour or two of trying or calling. Jesús Banegas

## 2021-08-18 NOTE — PROCEDURE NOTE: CLINICAL

## 2021-09-22 NOTE — PROCEDURE NOTE: CLINICAL
PROCEDURE NOTE: Lucentis 0.5mg PFS OD. Diagnosis: Neovascular AMD with Active CNV. Anesthesia: Lidocaine 4%. Prep: Betadine Flush. Prior to injection, risks/benefits/alternatives discussed including but not limited to infection, loss of vision or eye, hemorrhage, cataract, glaucoma, retinal tears or detachment. The patient wished to proceed with treatment. Topical anesthesia was induced with Alcaine. Additional anesthesia was achieved using drop(s) or injection checked above. A drop of Povidone-iodine 5% ophthalmic solution was instilled over the injection site and in the inferior fornix. Betadine prep was performed. A single use prefilled syringe of intravitreal Lucentis 0.5mg/0.05ml was used and excess was disposed of as waste. The needle was passed 3.0 mm posterior to the limbus in pseudophakic patients, and 3.5 mm posterior to the limbus in phakic patients. Injection Time 858. Patient tolerated the procedure well. There were no complications. The eye was irrigated with sterile irrigating solution. Post procedure instructions given. The patient was instructed to use Artificial Tears q.i.d. p.r.n for comfort. The patient was instructed to return for re-evaluation in approximately 4-12 weeks depending on his/her condition and was told to call immediately if vision decreases and/or if his/her eye becomes red, painful, and/or light sensitive. The patient was instructed to go to the emergency room or call 911 if unable to reach the doctor within an hour or two of trying or calling. Sunitha Medina

## 2021-09-29 NOTE — PROCEDURE NOTE: CLINICAL
PROCEDURE NOTE: Lucentis 0.5mg PFS OS. Diagnosis: Neovascular AMD with Active CNV. Anesthesia: Lidocaine 4%. Prep: Betadine Flush. Prior to injection, risks/benefits/alternatives discussed including but not limited to infection, loss of vision or eye, hemorrhage, cataract, glaucoma, retinal tears or detachment. The patient wished to proceed with treatment. Topical anesthesia was induced with Alcaine. Additional anesthesia was achieved using drop(s) or injection checked above. A drop of Povidone-iodine 5% ophthalmic solution was instilled over the injection site and in the inferior fornix. Betadine prep was performed. A single use prefilled syringe of intravitreal Lucentis 0.5mg/0.05ml was used and excess was disposed of as waste. The needle was passed 3.0 mm posterior to the limbus in pseudophakic patients, and 3.5 mm posterior to the limbus in phakic patients. Injection Time 8:25AM. Patient tolerated the procedure well. There were no complications. The eye was irrigated with sterile irrigating solution. Post procedure instructions given. The patient was instructed to use Artificial Tears q.i.d. p.r.n for comfort. The patient was instructed to return for re-evaluation in approximately 4-12 weeks depending on his/her condition and was told to call immediately if vision decreases and/or if his/her eye becomes red, painful, and/or light sensitive. The patient was instructed to go to the emergency room or call 911 if unable to reach the doctor within an hour or two of trying or calling. Jesús Banegas

## 2021-11-09 NOTE — PROCEDURE NOTE: CLINICAL
PROCEDURE NOTE: Lucentis 0.5mg PFS OD. Diagnosis: Neovascular AMD with Active CNV. Anesthesia: Lidocaine 4%. Prep: Betadine Flush. Prior to injection, risks/benefits/alternatives discussed including but not limited to infection, loss of vision or eye, hemorrhage, cataract, glaucoma, retinal tears or detachment. The patient wished to proceed with treatment. Topical anesthesia was induced with Alcaine. Additional anesthesia was achieved using drop(s) or injection checked above. A drop of Povidone-iodine 5% ophthalmic solution was instilled over the injection site and in the inferior fornix. Betadine prep was performed. A single use prefilled syringe of intravitreal Lucentis 0.5mg/0.05ml was used and excess was disposed of as waste. The needle was passed 3.0 mm posterior to the limbus in pseudophakic patients, and 3.5 mm posterior to the limbus in phakic patients. Injection Time 1240. Patient tolerated the procedure well. There were no complications. The eye was irrigated with sterile irrigating solution. Post procedure instructions given. The patient was instructed to use Artificial Tears q.i.d. p.r.n for comfort. The patient was instructed to return for re-evaluation in approximately 4-12 weeks depending on his/her condition and was told to call immediately if vision decreases and/or if his/her eye becomes red, painful, and/or light sensitive. The patient was instructed to go to the emergency room or call 911 if unable to reach the doctor within an hour or two of trying or calling. Misa Toribio

## 2021-12-01 NOTE — PROCEDURE NOTE: CLINICAL
PROCEDURE NOTE: Lucentis 0.5mg PFS OS. Diagnosis: Neovascular AMD with Active CNV. Anesthesia: Lidocaine 4%. Prep: Betadine Flush. Prior to injection, risks/benefits/alternatives discussed including but not limited to infection, loss of vision or eye, hemorrhage, cataract, glaucoma, retinal tears or detachment. The patient wished to proceed with treatment. Topical anesthesia was induced with Alcaine. Additional anesthesia was achieved using drop(s) or injection checked above. A drop of Povidone-iodine 5% ophthalmic solution was instilled over the injection site and in the inferior fornix. Betadine prep was performed. A single use prefilled syringe of intravitreal Lucentis 0.5mg/0.05ml was used and excess was disposed of as waste. The needle was passed 3.0 mm posterior to the limbus in pseudophakic patients, and 3.5 mm posterior to the limbus in phakic patients. Injection Time 3:04. Patient tolerated the procedure well. There were no complications. The eye was irrigated with sterile irrigating solution. Post procedure instructions given. The patient was instructed to use Artificial Tears q.i.d. p.r.n for comfort. The patient was instructed to return for re-evaluation in approximately 4-12 weeks depending on his/her condition and was told to call immediately if vision decreases and/or if his/her eye becomes red, painful, and/or light sensitive. The patient was instructed to go to the emergency room or call 911 if unable to reach the doctor within an hour or two of trying or calling. Emmie Mackay

## 2022-01-11 ENCOUNTER — PRE-OP/H&P (OUTPATIENT)
Dept: URBAN - METROPOLITAN AREA CLINIC 9 | Facility: CLINIC | Age: 77
End: 2022-01-11

## 2022-01-11 DIAGNOSIS — H52.223: ICD-10-CM

## 2022-01-11 DIAGNOSIS — H25.13: ICD-10-CM

## 2022-01-11 PROCEDURE — 99199ADVT ADVANCED VISION TESTING

## 2022-01-11 PROCEDURE — 99211PRE PRE OP VISIT

## 2022-01-11 PROCEDURE — 92136 OPHTHALMIC BIOMETRY: CPT

## 2022-01-11 ASSESSMENT — KERATOMETRY
OS_K1POWER_DIOPTERS: 44.25
OS_K2POWER_DIOPTERS: 45.00
OD_AXISANGLE_DEGREES: 89
OS_AXISANGLE2_DEGREES: 4
OS_AXISANGLE_DEGREES: 94
OD_K2POWER_DIOPTERS: 44.50
OD_AXISANGLE2_DEGREES: 179
OD_K1POWER_DIOPTERS: 43.50

## 2022-01-11 ASSESSMENT — VISUAL ACUITY
OS_SC: 20/50+1
OU_SC: 20/40-1
OS_GLARE: 20/100
OD_SC: 20/70+2
OD_GLARE: 20/50+1

## 2022-01-12 NOTE — PROCEDURE NOTE: CLINICAL
PROCEDURE NOTE: Lucentis 0.5mg PFS OD. Diagnosis: Neovascular AMD with Active CNV. Anesthesia: Lidocaine 4%. Prep: Betadine Flush. Prior to injection, risks/benefits/alternatives discussed including but not limited to infection, loss of vision or eye, hemorrhage, cataract, glaucoma, retinal tears or detachment. The patient wished to proceed with treatment. Topical anesthesia was induced with Alcaine. Additional anesthesia was achieved using drop(s) or injection checked above. A drop of Povidone-iodine 5% ophthalmic solution was instilled over the injection site and in the inferior fornix. Betadine prep was performed. A single use prefilled syringe of intravitreal Lucentis 0.5mg/0.05ml was used and excess was disposed of as waste. The needle was passed 3.0 mm posterior to the limbus in pseudophakic patients, and 3.5 mm posterior to the limbus in phakic patients. Injection Time *. Patient tolerated the procedure well. There were no complications. The eye was irrigated with sterile irrigating solution. Post procedure instructions given. The patient was instructed to use Artificial Tears q.i.d. p.r.n for comfort. The patient was instructed to return for re-evaluation in approximately 4-12 weeks depending on his/her condition and was told to call immediately if vision decreases and/or if his/her eye becomes red, painful, and/or light sensitive. The patient was instructed to go to the emergency room or call 911 if unable to reach the doctor within an hour or two of trying or calling. Romel Johnson

## 2022-01-25 ASSESSMENT — KERATOMETRY
OS_K2POWER_DIOPTERS: 45.00
OD_K2POWER_DIOPTERS: 44.50
OD_AXISANGLE_DEGREES: 89
OS_AXISANGLE2_DEGREES: 4
OD_AXISANGLE2_DEGREES: 179
OS_K1POWER_DIOPTERS: 44.25
OS_AXISANGLE_DEGREES: 94
OD_K1POWER_DIOPTERS: 43.50

## 2022-01-26 ENCOUNTER — POST-OP (OUTPATIENT)
Dept: URBAN - METROPOLITAN AREA CLINIC 9 | Facility: CLINIC | Age: 77
End: 2022-01-26

## 2022-01-26 DIAGNOSIS — Z98.41: ICD-10-CM

## 2022-01-26 DIAGNOSIS — Z96.1: ICD-10-CM

## 2022-01-26 PROCEDURE — 99024 POSTOP FOLLOW-UP VISIT: CPT

## 2022-01-26 ASSESSMENT — VISUAL ACUITY
OU_SC: 20/30-2
OD_SC: 20/30-2

## 2022-01-26 ASSESSMENT — TONOMETRY: OD_IOP_MMHG: 17

## 2022-01-31 ENCOUNTER — POST OP/EVAL OF SECOND EYE (OUTPATIENT)
Dept: URBAN - METROPOLITAN AREA CLINIC 9 | Facility: CLINIC | Age: 77
End: 2022-01-31

## 2022-01-31 DIAGNOSIS — Z96.1: ICD-10-CM

## 2022-01-31 DIAGNOSIS — H25.12: ICD-10-CM

## 2022-01-31 DIAGNOSIS — Z98.41: ICD-10-CM

## 2022-01-31 PROCEDURE — 92136 OPHTHALMIC BIOMETRY: CPT

## 2022-01-31 PROCEDURE — 99024 POSTOP FOLLOW-UP VISIT: CPT

## 2022-01-31 ASSESSMENT — VISUAL ACUITY
OS_SC: 20/50-1
OU_SC: 20/30+1
OD_SC: 20/30+2

## 2022-01-31 ASSESSMENT — TONOMETRY
OS_IOP_MMHG: 14
OD_IOP_MMHG: 14

## 2022-01-31 ASSESSMENT — KERATOMETRY
OD_K1POWER_DIOPTERS: 43.50
OS_AXISANGLE_DEGREES: 94
OS_AXISANGLE2_DEGREES: 4
OS_K2POWER_DIOPTERS: 45.00
OS_K1POWER_DIOPTERS: 44.25
OD_AXISANGLE_DEGREES: 89
OD_K2POWER_DIOPTERS: 44.50
OD_AXISANGLE2_DEGREES: 179

## 2022-02-09 ENCOUNTER — POST-OP (OUTPATIENT)
Dept: URBAN - METROPOLITAN AREA CLINIC 9 | Facility: CLINIC | Age: 77
End: 2022-02-09

## 2022-02-09 DIAGNOSIS — Z98.42: ICD-10-CM

## 2022-02-09 DIAGNOSIS — Z96.1: ICD-10-CM

## 2022-02-09 PROCEDURE — 99024 POSTOP FOLLOW-UP VISIT: CPT

## 2022-02-09 ASSESSMENT — VISUAL ACUITY
OS_SC: 20/30
OU_SC: 20/25-1

## 2022-02-09 ASSESSMENT — TONOMETRY: OS_IOP_MMHG: 15

## 2022-02-21 NOTE — PROCEDURE NOTE: CLINICAL
PROCEDURE NOTE: Lucentis 0.5mg PFS OD. Diagnosis: Neovascular AMD with Active CNV. Anesthesia: Lidocaine 4%. Prep: Betadine Flush. Prior to injection, risks/benefits/alternatives discussed including but not limited to infection, loss of vision or eye, hemorrhage, cataract, glaucoma, retinal tears or detachment. The patient wished to proceed with treatment. Topical anesthesia was induced with Alcaine. Additional anesthesia was achieved using drop(s) or injection checked above. A drop of Povidone-iodine 5% ophthalmic solution was instilled over the injection site and in the inferior fornix. Betadine prep was performed. A single use prefilled syringe of intravitreal Lucentis 0.5mg/0.05ml was used and excess was disposed of as waste. The needle was passed 3.0 mm posterior to the limbus in pseudophakic patients, and 3.5 mm posterior to the limbus in phakic patients. Injection Time *. Patient tolerated the procedure well. There were no complications. The eye was irrigated with sterile irrigating solution. Post procedure instructions given. The patient was instructed to use Artificial Tears q.i.d. p.r.n for comfort. The patient was instructed to return for re-evaluation in approximately 4-12 weeks depending on his/her condition and was told to call immediately if vision decreases and/or if his/her eye becomes red, painful, and/or light sensitive. The patient was instructed to go to the emergency room or call 911 if unable to reach the doctor within an hour or two of trying or calling. Negra Serrano

## 2022-02-22 ENCOUNTER — POST-OP (OUTPATIENT)
Dept: URBAN - METROPOLITAN AREA CLINIC 9 | Facility: CLINIC | Age: 77
End: 2022-02-22

## 2022-02-22 DIAGNOSIS — Z96.1: ICD-10-CM

## 2022-02-22 PROCEDURE — 99024 POSTOP FOLLOW-UP VISIT: CPT

## 2022-02-22 ASSESSMENT — KERATOMETRY
OS_AXISANGLE_DEGREES: 95
OD_AXISANGLE2_DEGREES: 15
OD_AXISANGLE_DEGREES: 105
OS_K2POWER_DIOPTERS: 45.25
OS_AXISANGLE2_DEGREES: 5
OD_K2POWER_DIOPTERS: 44.75
OS_K1POWER_DIOPTERS: 44.25
OD_K1POWER_DIOPTERS: 44.00

## 2022-02-22 ASSESSMENT — TONOMETRY
OD_IOP_MMHG: 12
OS_IOP_MMHG: 13

## 2022-02-22 ASSESSMENT — VISUAL ACUITY
OU_SC: 20/30
OD_SC: 20/30-1
OS_SC: 20/30+2

## 2022-02-28 NOTE — PROCEDURE NOTE: CLINICAL
PROCEDURE NOTE: Lucentis 0.5mg PFS OS. Diagnosis: Neovascular AMD with Active CNV. Anesthesia: Lidocaine 4%. Prep: Betadine Flush. Prior to injection, risks/benefits/alternatives discussed including but not limited to infection, loss of vision or eye, hemorrhage, cataract, glaucoma, retinal tears or detachment. The patient wished to proceed with treatment. Topical anesthesia was induced with Alcaine. Additional anesthesia was achieved using drop(s) or injection checked above. A drop of Povidone-iodine 5% ophthalmic solution was instilled over the injection site and in the inferior fornix. Betadine prep was performed. A single use prefilled syringe of intravitreal Lucentis 0.5mg/0.05ml was used and excess was disposed of as waste. The needle was passed 3.0 mm posterior to the limbus in pseudophakic patients, and 3.5 mm posterior to the limbus in phakic patients. Injection Time *. Patient tolerated the procedure well. There were no complications. The eye was irrigated with sterile irrigating solution. Post procedure instructions given. The patient was instructed to use Artificial Tears q.i.d. p.r.n for comfort. The patient was instructed to return for re-evaluation in approximately 4-12 weeks depending on his/her condition and was told to call immediately if vision decreases and/or if his/her eye becomes red, painful, and/or light sensitive. The patient was instructed to go to the emergency room or call 911 if unable to reach the doctor within an hour or two of trying or calling. Geraldine Munoz

## 2022-03-02 NOTE — PATIENT DISCUSSION
WORSENED 9/17 18, POST ELEA. PROGRESS NOTE:   Authored by Dr. Georgia Paz MD (PGY-2). Available on TEAMS.    Patient is a 65y old  Female who presents with a chief complaint of sepsis 2/2 wound infection (01 Mar 2022 18:55)      SUBJECTIVE / OVERNIGHT EVENTS:  Found fallen on floor overnight.     ADDITIONAL REVIEW OF SYSTEMS:  Patient denies fevers, chills, chest pain, shortness of breath, nausea, abdominal pain, diarrhea, constipation, dysuria, leg swelling, headache, light headedness.    MEDICATIONS  (STANDING):  ampicillin/sulbactam  IVPB 3 Gram(s) IV Intermittent every 12 hours  aspirin enteric coated 81 milliGRAM(s) Oral daily  atorvastatin 80 milliGRAM(s) Oral at bedtime  Dakins Solution - 1/4 Strength 1 Application(s) Topical every 8 hours  dextrose 40% Gel 15 Gram(s) Oral once  dextrose 5%. 1000 milliLiter(s) (50 mL/Hr) IV Continuous <Continuous>  dextrose 5%. 1000 milliLiter(s) (100 mL/Hr) IV Continuous <Continuous>  dextrose 50% Injectable 25 Gram(s) IV Push once  dextrose 50% Injectable 12.5 Gram(s) IV Push once  dextrose 50% Injectable 25 Gram(s) IV Push once  FLUoxetine 40 milliGRAM(s) Oral daily  glucagon  Injectable 1 milliGRAM(s) IntraMuscular once  insulin glargine Injectable (LANTUS) 28 Unit(s) SubCutaneous at bedtime  insulin lispro (ADMELOG) corrective regimen sliding scale   SubCutaneous at bedtime  insulin lispro (ADMELOG) corrective regimen sliding scale   SubCutaneous three times a day before meals  insulin lispro Injectable (ADMELOG) 26 Unit(s) SubCutaneous three times a day with meals  metoprolol tartrate 100 milliGRAM(s) Oral two times a day  nystatin Powder 1 Application(s) Topical two times a day  polyethylene glycol 3350 17 Gram(s) Oral two times a day  senna 2 Tablet(s) Oral at bedtime  sevelamer carbonate 800 milliGRAM(s) Oral every 8 hours  sodium chloride 0.9%. 1000 milliLiter(s) (60 mL/Hr) IV Continuous <Continuous>    MEDICATIONS  (PRN):  acetaminophen     Tablet .. 650 milliGRAM(s) Oral every 6 hours PRN Mild Pain (1 - 3), Moderate Pain (4 - 6)  bisacodyl Suppository 10 milliGRAM(s) Rectal daily PRN Constipation  sodium chloride 0.65% Nasal 1 Spray(s) Both Nostrils every 6 hours PRN Nasal Congestion      CAPILLARY BLOOD GLUCOSE      POCT Blood Glucose.: 196 mg/dL (01 Mar 2022 22:05)  POCT Blood Glucose.: 188 mg/dL (01 Mar 2022 17:49)  POCT Blood Glucose.: 198 mg/dL (01 Mar 2022 13:30)  POCT Blood Glucose.: 135 mg/dL (01 Mar 2022 08:35)    I&O's Summary    28 Feb 2022 07:01  -  01 Mar 2022 07:00  --------------------------------------------------------  IN: 740 mL / OUT: 600 mL / NET: 140 mL    01 Mar 2022 07:01  -  02 Mar 2022 05:56  --------------------------------------------------------  IN: 960 mL / OUT: 1350 mL / NET: -390 mL        PHYSICAL EXAM:  Vital Signs Last 24 Hrs  T(C): 37.1 (01 Mar 2022 21:07), Max: 37.1 (01 Mar 2022 21:07)  T(F): 98.7 (01 Mar 2022 21:07), Max: 98.7 (01 Mar 2022 21:07)  HR: 72 (01 Mar 2022 21:07) (59 - 77)  BP: 133/64 (01 Mar 2022 21:07) (107/69 - 133/64)  BP(mean): --  RR: 18 (01 Mar 2022 21:07) (18 - 18)  SpO2: 97% (01 Mar 2022 21:07) (96% - 100%)    CONSTITUTIONAL: NAD, well-developed  RESPIRATORY: Normal respiratory effort; lungs are clear to auscultation bilaterally  CARDIOVASCULAR: Regular rate and rhythm, normal S1 and S2, no murmur/rub/gallop; No lower extremity edema; Peripheral pulses are 2+ bilaterally  ABDOMEN: Nontender to palpation, normoactive bowel sounds, no rebound/guarding; No hepatosplenomegaly  MUSCLOSKELETAL: no clubbing or cyanosis of digits; no joint swelling or tenderness to palpation  PSYCH: A+O to person, place, and time; affect appropriate    LABS:                        10.5   17.05 )-----------( 263      ( 01 Mar 2022 16:20 )             34.5     03-01    129<L>  |  91<L>  |  93<H>  ----------------------------<  177<H>  4.1   |  20<L>  |  4.29<H>    Ca    9.2      01 Mar 2022 22:53  Phos  6.9     03-01  Mg     2.7     03-01    TPro  7.2  /  Alb  3.5  /  TBili  0.6  /  DBili  x   /  AST  17  /  ALT  14  /  AlkPhos  165<H>  03-01                Tele Reviewed:    RADIOLOGY & ADDITIONAL TESTS:  Results Reviewed:   Imaging Personally Reviewed:  Electrocardiogram Personally Reviewed:     PROGRESS NOTE:   Authored by Dr. Georgia Paz MD (PGY-2). Available on TEAMS.    Patient is a 65y old  Female who presents with a chief complaint of sepsis 2/2 wound infection (01 Mar 2022 18:55)      SUBJECTIVE / OVERNIGHT EVENTS:  Found fallen on floor overnight. Neuro exam unremarkbable, no reports of head strike, CT head deferred.   Pt this morning A&O3 denied further hallucinations. She feels tired and out of it, but otherwise denies new symptoms or concerns.     ADDITIONAL REVIEW OF SYSTEMS:  Patient denies fevers, chills, chest pain, shortness of breath, nausea, abdominal pain, diarrhea, constipation, dysuria, leg swelling, headache, light headedness.    MEDICATIONS  (STANDING):  ampicillin/sulbactam  IVPB 3 Gram(s) IV Intermittent every 12 hours  aspirin enteric coated 81 milliGRAM(s) Oral daily  atorvastatin 80 milliGRAM(s) Oral at bedtime  Dakins Solution - 1/4 Strength 1 Application(s) Topical every 8 hours  dextrose 40% Gel 15 Gram(s) Oral once  dextrose 5%. 1000 milliLiter(s) (50 mL/Hr) IV Continuous <Continuous>  dextrose 5%. 1000 milliLiter(s) (100 mL/Hr) IV Continuous <Continuous>  dextrose 50% Injectable 25 Gram(s) IV Push once  dextrose 50% Injectable 12.5 Gram(s) IV Push once  dextrose 50% Injectable 25 Gram(s) IV Push once  FLUoxetine 40 milliGRAM(s) Oral daily  glucagon  Injectable 1 milliGRAM(s) IntraMuscular once  insulin glargine Injectable (LANTUS) 28 Unit(s) SubCutaneous at bedtime  insulin lispro (ADMELOG) corrective regimen sliding scale   SubCutaneous at bedtime  insulin lispro (ADMELOG) corrective regimen sliding scale   SubCutaneous three times a day before meals  insulin lispro Injectable (ADMELOG) 26 Unit(s) SubCutaneous three times a day with meals  metoprolol tartrate 100 milliGRAM(s) Oral two times a day  nystatin Powder 1 Application(s) Topical two times a day  polyethylene glycol 3350 17 Gram(s) Oral two times a day  senna 2 Tablet(s) Oral at bedtime  sevelamer carbonate 800 milliGRAM(s) Oral every 8 hours  sodium chloride 0.9%. 1000 milliLiter(s) (60 mL/Hr) IV Continuous <Continuous>    MEDICATIONS  (PRN):  acetaminophen     Tablet .. 650 milliGRAM(s) Oral every 6 hours PRN Mild Pain (1 - 3), Moderate Pain (4 - 6)  bisacodyl Suppository 10 milliGRAM(s) Rectal daily PRN Constipation  sodium chloride 0.65% Nasal 1 Spray(s) Both Nostrils every 6 hours PRN Nasal Congestion      CAPILLARY BLOOD GLUCOSE      POCT Blood Glucose.: 196 mg/dL (01 Mar 2022 22:05)  POCT Blood Glucose.: 188 mg/dL (01 Mar 2022 17:49)  POCT Blood Glucose.: 198 mg/dL (01 Mar 2022 13:30)  POCT Blood Glucose.: 135 mg/dL (01 Mar 2022 08:35)    I&O's Summary    28 Feb 2022 07:01  -  01 Mar 2022 07:00  --------------------------------------------------------  IN: 740 mL / OUT: 600 mL / NET: 140 mL    01 Mar 2022 07:01  -  02 Mar 2022 05:56  --------------------------------------------------------  IN: 960 mL / OUT: 1350 mL / NET: -390 mL        PHYSICAL EXAM:  Vital Signs Last 24 Hrs  T(C): 37.1 (01 Mar 2022 21:07), Max: 37.1 (01 Mar 2022 21:07)  T(F): 98.7 (01 Mar 2022 21:07), Max: 98.7 (01 Mar 2022 21:07)  HR: 72 (01 Mar 2022 21:07) (59 - 77)  BP: 133/64 (01 Mar 2022 21:07) (107/69 - 133/64)  BP(mean): --  RR: 18 (01 Mar 2022 21:07) (18 - 18)  SpO2: 97% (01 Mar 2022 21:07) (96% - 100%)    CONSTITUTIONAL: NAD, morbidly obese, sitting up on recliner chair  RESPIRATORY: Normal respiratory effort; lungs are clear to auscultation bilaterally, on 4L NC   CARDIOVASCULAR: Regular rate and rhythm, normal S1 and S2, no murmur/rub/gallop; 1+ b/l lower extremity edema; Peripheral pulses are 2+ bilaterally  ABDOMEN: Large panus, Nontender to palpation, normoactive bowel sounds, no rebound/guarding  MUSCLOSKELETAL: no clubbing or cyanosis of digits; no joint swelling or tenderness to palpation  NEURO: no asterixis, A&O 3, still lethargic and slower to respond compared to on admission. No neurological deficits.   SKIN: sacral wound with packing soaked in dried blood, no drainage. Gluteal folds very tender to palpation and some skin tears noted in the area.     LABS:                        10.5   17.05 )-----------( 263      ( 01 Mar 2022 16:20 )             34.5     03-01    129<L>  |  91<L>  |  93<H>  ----------------------------<  177<H>  4.1   |  20<L>  |  4.29<H>    Ca    9.2      01 Mar 2022 22:53  Phos  6.9     03-01  Mg     2.7     03-01    TPro  7.2  /  Alb  3.5  /  TBili  0.6  /  DBili  x   /  AST  17  /  ALT  14  /  AlkPhos  165<H>  03-01

## 2022-06-17 RX ORDER — CITALOPRAM 20 MG/1
TABLET ORAL
COMMUNITY
Start: 2020-10-08

## 2022-06-17 RX ORDER — CYCLOBENZAPRINE HCL 10 MG
TABLET ORAL
COMMUNITY
Start: 2020-11-02

## 2022-06-17 RX ORDER — LOSARTAN POTASSIUM AND HYDROCHLOROTHIAZIDE 25; 100 MG/1; MG/1
TABLET ORAL
COMMUNITY
Start: 2019-12-09 | End: 2022-08-24 | Stop reason: SDUPTHER

## 2022-06-17 RX ORDER — FAMOTIDINE, CALCIUM CARBONATE, AND MAGNESIUM HYDROXIDE 10; 800; 165 MG/1; MG/1; MG/1
TABLET, CHEWABLE ORAL
COMMUNITY
Start: 2020-10-18

## 2022-06-17 RX ORDER — ATORVASTATIN CALCIUM 20 MG/1
TABLET, FILM COATED ORAL
COMMUNITY
Start: 2021-04-23 | End: 2022-08-24 | Stop reason: SDUPTHER

## 2022-08-21 PROBLEM — H53.9 VISUAL DISTURBANCE: Status: ACTIVE | Noted: 2022-08-21

## 2022-08-21 PROBLEM — L98.8 FISTULA: Status: ACTIVE | Noted: 2022-08-21

## 2022-08-21 PROBLEM — K92.2 GI (GASTROINTESTINAL BLEED): Status: ACTIVE | Noted: 2022-08-21

## 2022-08-21 PROBLEM — G47.9 SLEEP DISTURBANCE: Status: ACTIVE | Noted: 2022-08-21

## 2022-08-21 PROBLEM — M54.16 LUMBAR RADICULOPATHY, RIGHT: Status: ACTIVE | Noted: 2022-08-21

## 2022-08-21 PROBLEM — I10 HYPERTENSION: Status: ACTIVE | Noted: 2022-08-21

## 2022-08-21 PROBLEM — M85.852 OSTEOPENIA OF NECK OF LEFT FEMUR: Status: ACTIVE | Noted: 2022-08-21

## 2022-08-21 PROBLEM — Z98.890 HISTORY OF ESOPHAGOGASTRODUODENOSCOPY: Status: ACTIVE | Noted: 2022-08-21

## 2022-08-21 PROBLEM — Z00.8: Status: ACTIVE | Noted: 2022-08-21

## 2022-08-21 PROBLEM — L60.1 ONYCHOLYSIS: Status: ACTIVE | Noted: 2022-08-21

## 2022-08-21 PROBLEM — C44.91 BASAL CELL CARCINOMA: Status: ACTIVE | Noted: 2022-08-21

## 2022-08-21 PROBLEM — K57.92 DIVERTICULITIS: Status: ACTIVE | Noted: 2022-08-21

## 2022-08-21 PROBLEM — M54.30 SCIATICA: Status: ACTIVE | Noted: 2022-08-21

## 2022-08-21 PROBLEM — R31.29 HEMATURIA, MICROSCOPIC: Status: ACTIVE | Noted: 2022-08-21

## 2022-08-21 PROBLEM — D50.9 IRON DEFICIENCY ANEMIA: Status: ACTIVE | Noted: 2022-08-21

## 2022-08-21 PROBLEM — N95.9 POST MENOPAUSAL PROBLEMS: Status: ACTIVE | Noted: 2022-08-21

## 2022-08-21 PROBLEM — K57.32 DIVERTICULITIS LARGE INTESTINE W/O PERFORATION OR ABSCESS W/O BLEEDING: Status: ACTIVE | Noted: 2022-08-21

## 2022-08-21 PROBLEM — N28.1 RENAL CYST: Status: ACTIVE | Noted: 2022-08-21

## 2022-08-21 PROBLEM — R33.9 URINARY RETENTION: Status: ACTIVE | Noted: 2022-08-21

## 2022-08-21 PROBLEM — S52.90XA FOREARM FRACTURE: Status: ACTIVE | Noted: 2022-08-21

## 2022-08-21 PROBLEM — N39.41 URGE INCONTINENCE OF URINE: Status: ACTIVE | Noted: 2022-08-21

## 2022-08-21 PROBLEM — Z90.49 HX OF COLECTOMY: Status: ACTIVE | Noted: 2022-08-21

## 2022-08-21 PROBLEM — K21.00 GASTROESOPHAGEAL REFLUX DISEASE WITH ESOPHAGITIS WITHOUT HEMORRHAGE: Status: ACTIVE | Noted: 2022-08-21

## 2022-08-21 PROBLEM — E03.9 HYPOTHYROIDISM: Status: ACTIVE | Noted: 2022-08-21

## 2022-08-21 PROBLEM — F43.20 ADULT SITUATIONAL STRESS DISORDER: Status: ACTIVE | Noted: 2022-08-21

## 2022-08-21 PROBLEM — E78.5 HYPERLIPIDEMIA: Status: ACTIVE | Noted: 2022-08-21

## 2022-08-21 PROBLEM — E55.9 VITAMIN D DEFICIENCY: Status: ACTIVE | Noted: 2022-08-21

## 2022-08-21 PROBLEM — J30.1 SEASONAL ALLERGIC RHINITIS DUE TO POLLEN: Status: ACTIVE | Noted: 2022-08-21

## 2022-08-21 PROBLEM — Z98.890 HX OF COLONOSCOPY: Status: ACTIVE | Noted: 2022-08-21

## 2022-08-21 PROBLEM — Z85.828 HISTORY OF BASAL CELL CANCER: Status: ACTIVE | Noted: 2022-08-21

## 2022-08-21 PROBLEM — M19.041 PRIMARY OSTEOARTHRITIS, RIGHT HAND: Status: ACTIVE | Noted: 2022-08-21

## 2022-08-21 PROBLEM — R39.15 URGENCY OF URINATION: Status: ACTIVE | Noted: 2022-08-21

## 2022-08-21 PROBLEM — M46.1 BILATERAL SACROILIITIS (HCC): Status: ACTIVE | Noted: 2022-08-21

## 2022-08-21 PROBLEM — H26.9 CATARACT: Status: ACTIVE | Noted: 2022-08-21

## 2022-09-20 PROBLEM — Z00.8: Status: RESOLVED | Noted: 2022-08-21 | Resolved: 2022-09-20

## 2022-10-13 NOTE — PATIENT DISCUSSION
If ERM progresses and the patient’s symptoms worsen or visual acuity decreases significantly, patient may require vitrectomy and membrane peeling in the future. 13-Oct-2022 09:48

## 2022-10-21 ENCOUNTER — COMPREHENSIVE EXAM (OUTPATIENT)
Dept: URBAN - METROPOLITAN AREA CLINIC 9 | Facility: CLINIC | Age: 77
End: 2022-10-21

## 2022-10-21 DIAGNOSIS — H04.123: ICD-10-CM

## 2022-10-21 DIAGNOSIS — H26.493: ICD-10-CM

## 2022-10-21 DIAGNOSIS — H35.363: ICD-10-CM

## 2022-10-21 DIAGNOSIS — H43.813: ICD-10-CM

## 2022-10-21 PROCEDURE — 92014 COMPRE OPH EXAM EST PT 1/>: CPT

## 2022-10-21 PROCEDURE — 92015 DETERMINE REFRACTIVE STATE: CPT

## 2022-10-21 ASSESSMENT — TONOMETRY
OD_IOP_MMHG: 13
OS_IOP_MMHG: 13

## 2022-10-21 ASSESSMENT — VISUAL ACUITY
OS_SC: 20/30-1
OS_SC: J3
OU_SC: 20/25
OD_GLARE: 20/25
OS_GLARE: 20/25
OU_SC: J3
OD_SC: 20/30-1
OD_SC: J3

## 2022-10-21 ASSESSMENT — KERATOMETRY
OS_K1POWER_DIOPTERS: 45.00
OD_AXISANGLE2_DEGREES: 3
OD_AXISANGLE_DEGREES: 93
OD_K2POWER_DIOPTERS: 44.50
OS_K2POWER_DIOPTERS: 45.75
OD_K1POWER_DIOPTERS: 43.50
OS_AXISANGLE2_DEGREES: 79
OS_AXISANGLE_DEGREES: 169

## 2022-11-01 NOTE — PROCEDURE NOTE: CLINICAL

## 2022-11-08 NOTE — PROCEDURE NOTE: CLINICAL
PROCEDURE NOTE: Lucentis 0.5mg PFS OS. Diagnosis: Neovascular AMD with Active CNV. Anesthesia: Lidocaine 4%. Prep: Betadine Flush. Prior to injection, risks/benefits/alternatives discussed including but not limited to infection, loss of vision or eye, hemorrhage, cataract, glaucoma, retinal tears or detachment. The patient wished to proceed with treatment. Topical anesthesia was induced with Alcaine. Additional anesthesia was achieved using drop(s) or injection checked above. A drop of Povidone-iodine 5% ophthalmic solution was instilled over the injection site and in the inferior fornix. Betadine prep was performed. A single use prefilled syringe of intravitreal Lucentis 0.5mg/0.05ml was used and excess was disposed of as waste. The needle was passed 3.0 mm posterior to the limbus in pseudophakic patients, and 3.5 mm posterior to the limbus in phakic patients. Injection Time *. Patient tolerated the procedure well. There were no complications. The eye was irrigated with sterile irrigating solution. Post procedure instructions given. The patient was instructed to use Artificial Tears q.i.d. p.r.n for comfort. The patient was instructed to return for re-evaluation in approximately 4-12 weeks depending on his/her condition and was told to call immediately if vision decreases and/or if his/her eye becomes red, painful, and/or light sensitive. The patient was instructed to go to the emergency room or call 911 if unable to reach the doctor within an hour or two of trying or calling. Misa Toribio

## 2022-12-06 NOTE — PROCEDURE NOTE: CLINICAL
PROCEDURE NOTE: Vabysmo Injection #1 OD. Diagnosis: Neovascular AMD with Active CNV. Anesthesia: Lidocaine 4%. Prep: Betadine Drops. Prior to injection, risks/benefits/alternatives discussed including infection, loss of vision, hemorrhage, cataract, glaucoma, retinal tears or detachment. The patient wished to proceed with treatment. Betadine prep was performed. Topical anesthesia was induced with Alcaine. Additional anesthesia was achieved using drop(s) or injection checked above. A drop of Povidone-Iodine 5% ophthalmic solution was instilled over the injection site and in the inferior fornix. Using the syringe provided, Vabysmo 6mg ( 0.05 mL of 120mg/mL single dose vial) was injected into the vitreous cavity. The remainder of the Vabysmo in the single-use vial was then discarded in a medical waste disposal container. The needle was passed 3.0 mm posterior to the limbus in pseudophakic patients, and 3.5 mm posterior to the limbus in phakic patients. Patient tolerated procedure well. There were no complications. Injection time: *. The eye was irrigated with sterile irrigating solution. Post procedure instructions given. The patient was instructed to return for re-evaluation in approximately 4-12 weeks depending on his/her condition and was told to call immediately if vision decreases and/or if his/her eye becomes red, painful, and/or sensitive. The patient was instructed to go to the emergency room or call 911 if unable to reach the doctor within an hour or two of trying or calling. The patient was instructed to use Artificial Tears q.i.d. p.r.n. for comfort. Negra Serrano

## 2022-12-07 NOTE — PROCEDURE NOTE: CLINICAL
PROCEDURE NOTE: Vabysmo Injection #1 OS. Diagnosis: Neovascular AMD with Active CNV. Anesthesia: Lidocaine 4%. Prep: Betadine Drops. Prior to injection, risks/benefits/alternatives discussed including infection, loss of vision, hemorrhage, cataract, glaucoma, retinal tears or detachment. The patient wished to proceed with treatment. Betadine prep was performed. Topical anesthesia was induced with Alcaine. Additional anesthesia was achieved using drop(s) or injection checked above. A drop of Povidone-Iodine 5% ophthalmic solution was instilled over the injection site and in the inferior fornix. Using the syringe provided, Vabysmo 6mg ( 0.05 mL of 120mg/mL single dose vial) was injected into the vitreous cavity. The remainder of the Vabysmo in the single-use vial was then discarded in a medical waste disposal container. The needle was passed 3.0 mm posterior to the limbus in pseudophakic patients, and 3.5 mm posterior to the limbus in phakic patients. Patient tolerated procedure well. There were no complications. Injection time: *. The eye was irrigated with sterile irrigating solution. Post procedure instructions given. The patient was instructed to return for re-evaluation in approximately 4-12 weeks depending on his/her condition and was told to call immediately if vision decreases and/or if his/her eye becomes red, painful, and/or sensitive. The patient was instructed to go to the emergency room or call 911 if unable to reach the doctor within an hour or two of trying or calling. The patient was instructed to use Artificial Tears q.i.d. p.r.n. for comfort. Javier Gao

## 2023-01-18 NOTE — PATIENT DISCUSSION
He is still feeling pretty lousy and chest hurts from coughing and he is using his inhaler prn   I did explain to the mom that were waiting on the cultures it takes 1-3 business days RECENT DIAGNOSIS 12/13/16, WILL OBTAIN VISUAL FIELDS.

## 2023-01-18 NOTE — PROCEDURE NOTE: CLINICAL
PROCEDURE NOTE: Vabysmo Injection OD. Diagnosis: Neovascular AMD with Active CNV. Anesthesia: Lidocaine 4%. Prep: Betadine Drops. Prior to injection, risks/benefits/alternatives discussed including infection, loss of vision, hemorrhage, cataract, glaucoma, retinal tears or detachment. The patient wished to proceed with treatment. Betadine prep was performed. Topical anesthesia was induced with Alcaine. Additional anesthesia was achieved using drop(s) or injection checked above. A drop of Povidone-Iodine 5% ophthalmic solution was instilled over the injection site and in the inferior fornix. Using the syringe provided, Vabysmo 6mg ( 0.05 mL of 120mg/mL single dose vial) was injected into the vitreous cavity. The remainder of the Vabysmo in the single-use vial was then discarded in a medical waste disposal container. The needle was passed 3.0 mm posterior to the limbus in pseudophakic patients, and 3.5 mm posterior to the limbus in phakic patients. Patient tolerated procedure well. There were no complications. Injection time: *. The eye was irrigated with sterile irrigating solution. Post procedure instructions given. The patient was instructed to return for re-evaluation in approximately 4-12 weeks depending on his/her condition and was told to call immediately if vision decreases and/or if his/her eye becomes red, painful, and/or sensitive. The patient was instructed to go to the emergency room or call 911 if unable to reach the doctor within an hour or two of trying or calling. The patient was instructed to use Artificial Tears q.i.d. p.r.n. for comfort. Pennye Hand

## 2023-01-25 NOTE — PROCEDURE NOTE: CLINICAL
PROCEDURE NOTE: Vabysmo Injection Sample OS. Diagnosis: Neovascular AMD with Active CNV. Anesthesia: Lidocaine 4%. Prep: Betadine Drops. Prior to injection, risks/benefits/alternatives discussed including infection, loss of vision, hemorrhage, cataract, glaucoma, retinal tears or detachment. The patient wished to proceed with treatment. Betadine prep was performed. Topical anesthesia was induced with Alcaine. Additional anesthesia was achieved using drop(s) or injection checked above. A drop of Povidone-Iodine 5% ophthalmic solution was instilled over the injection site and in the inferior fornix. Using the syringe provided, Vabysmo 6mg ( 0.05 mL of 120mg/mL single dose vial) was injected into the vitreous cavity. The remainder of the Vabysmo in the single-use vial was then discarded in a medical waste disposal container. The needle was passed 3.0 mm posterior to the limbus in pseudophakic patients, and 3.5 mm posterior to the limbus in phakic patients. Patient tolerated procedure well. There were no complications. Injection time: 8:48. The eye was irrigated with sterile irrigating solution. Post procedure instructions given. The patient was instructed to return for re-evaluation in approximately 4-12 weeks depending on his/her condition and was told to call immediately if vision decreases and/or if his/her eye becomes red, painful, and/or sensitive. The patient was instructed to go to the emergency room or call 911 if unable to reach the doctor within an hour or two of trying or calling. The patient was instructed to use Artificial Tears q.i.d. p.r.n. for comfort. Pasquale Andre

## 2023-02-16 NOTE — PATIENT DISCUSSION
Continue low sodium diet, add boost plus daily, chocolate, RD following  Goals: PO to meet 75% of needs with ONS by next RD follow up  Nutrition Goal Status: new  Communication of RD Recs: reviewed with physician     Assessment and Plan     Nutrition Problem:  Moderate  Protein-Calorie Malnutrition  Malnutrition in the context of  Chronic Illness/Injury     Related to (etiology):  Inadequate oral intake in the presence of altered mental status      Signs and Symptoms (as evidenced by):  Energy Intake: <75% of estimated energy requirement for > one month  Body Fat Depletion: moderate depletion of orbitals and triceps mild, chest  Muscle Mass Depletion: moderate depletion of temples, clavicle region, interosseous muscle and lower extremities         Interventions(treatment strategy):  Mineral modified diet- low sodium  Commercial beverage( protein, calories) boost plus daily, chocolate  Collaboration with other providers     Nutrition Diagnosis Status:  Continues   Recommended observation.

## 2023-09-08 NOTE — PATIENT DISCUSSION
NEW 4/3/18. Finasteride Pregnancy And Lactation Text: This medication is absolutely contraindicated during pregnancy. It is unknown if it is excreted in breast milk.

## 2023-10-01 NOTE — PROCEDURE NOTE: CLINICAL
PROCEDURE NOTE: Lucentis 0.5mg PFS OS. Diagnosis: Neovascular AMD with Active CNV. Anesthesia: Lidocaine. Prep: Betadine Flush. Prior to injection, risks/benefits/alternatives discussed including but not limited to infection, loss of vision or eye, hemorrhage, cataract, glaucoma, retinal tears or detachment. The patient wished to proceed with treatment. Topical anesthesia was induced with Alcaine. Additional anesthesia was achieved using drop(s) or injection checked above. A drop of Povidone-iodine 5% ophthalmic solution was instilled over the injection site and in the inferior fornix. Betadine prep was performed. A single use prefilled syringe of intravitreal Lucentis 0.5mg/0.05ml was used and excess was disposed of as waste. The needle was passed 3.0 mm posterior to the limbus in pseudophakic patients, and 3.5 mm posterior to the limbus in phakic patients. Injection Time *. Patient tolerated the procedure well. There were no complications. The eye was irrigated with sterile irrigating solution. Post procedure instructions given. The patient was instructed to use Artificial Tears q.i.d. p.r.n for comfort. The patient was instructed to return for re-evaluation in approximately 4-12 weeks depending on his/her condition and was told to call immediately if vision decreases and/or if his/her eye becomes red, painful, and/or light sensitive. The patient was instructed to go to the emergency room or call 911 if unable to reach the doctor within an hour or two of trying or calling. Erum Luevano Pt arrives with bright red blood when having a BM, states diarrhea since Friday but noticed blood with Bms today. ABCs intact and Aox4.

## 2024-08-16 NOTE — PATIENT DISCUSSION
ARTIFICIAL TEARS to affected eye(s) as needed. An ASA service to Sports Medicine was placed for patient to be seen for     Internal derangement of left knee     Please call and schedule patient to be seen with the appropriate provider.     Thank you

## 2024-09-03 NOTE — PATIENT DISCUSSION
Detail Level: Zone Recommended weight and BMI control through healthy diet and exercise, green leafy veggies, UV protection, and not smoking. Reviewed the benefits of AREDS II VITAMINS VERUS GENOTYPE directed vitamin therapy, and recommended following one or the other to try and prevent the progression of the disease. Reviewed the importance of daily monitoring of the vision in each eye independently, along with the use of the Amsler grid daily and instructed patient to call and return immediately for any new changes in their vision or on the Amsler grid. Patient instructed on the importance of regular follow up and monitoring for the early detection of conversion to wet AMD as early detection results in early treatment and better outcomes.

## 2024-10-31 NOTE — PATIENT DISCUSSION
IN THE PAST WORSENED WITH EYLEA. Spoke with Dr. Rosario (pathologist at [facility]) regarding any updates  Cytology from thoracentesis 4/20/18 is negative.  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  He reviewed the flow earlier and that did not capture any evidence of myeloma.  His leading differential at this point is GI or breast origin.  Updated patient and family members at bedside.    Landon Larsen MD  Hematology Oncology Fellow PGY4     Improved